# Patient Record
Sex: FEMALE | Race: WHITE | NOT HISPANIC OR LATINO | Employment: FULL TIME | URBAN - METROPOLITAN AREA
[De-identification: names, ages, dates, MRNs, and addresses within clinical notes are randomized per-mention and may not be internally consistent; named-entity substitution may affect disease eponyms.]

---

## 2017-01-04 ENCOUNTER — TELEPHONE (OUTPATIENT)
Dept: FAMILY MEDICINE | Facility: CLINIC | Age: 37
End: 2017-01-04

## 2017-01-04 NOTE — TELEPHONE ENCOUNTER
Stated, blood pressure 152/109. Requesting to see Dr Mai. Phone room instructed to schedule patient with any available provider.

## 2017-01-12 ENCOUNTER — OFFICE VISIT (OUTPATIENT)
Dept: FAMILY MEDICINE | Facility: CLINIC | Age: 37
End: 2017-01-12
Payer: COMMERCIAL

## 2017-01-12 ENCOUNTER — LAB VISIT (OUTPATIENT)
Dept: LAB | Facility: HOSPITAL | Age: 37
End: 2017-01-12
Attending: FAMILY MEDICINE
Payer: COMMERCIAL

## 2017-01-12 VITALS
OXYGEN SATURATION: 99 % | TEMPERATURE: 99 F | DIASTOLIC BLOOD PRESSURE: 84 MMHG | HEIGHT: 60 IN | BODY MASS INDEX: 38.52 KG/M2 | SYSTOLIC BLOOD PRESSURE: 144 MMHG | HEART RATE: 85 BPM | WEIGHT: 196.19 LBS

## 2017-01-12 DIAGNOSIS — Z00.00 ANNUAL PHYSICAL EXAM: ICD-10-CM

## 2017-01-12 DIAGNOSIS — Z00.00 ANNUAL PHYSICAL EXAM: Primary | ICD-10-CM

## 2017-01-12 DIAGNOSIS — I10 ESSENTIAL HYPERTENSION: ICD-10-CM

## 2017-01-12 LAB
ALBUMIN SERPL BCP-MCNC: 3.8 G/DL
ALP SERPL-CCNC: 100 U/L
ALT SERPL W/O P-5'-P-CCNC: 18 U/L
ANION GAP SERPL CALC-SCNC: 7 MMOL/L
AST SERPL-CCNC: 19 U/L
BASOPHILS # BLD AUTO: 0.01 K/UL
BASOPHILS NFR BLD: 0.1 %
BILIRUB SERPL-MCNC: 0.3 MG/DL
BUN SERPL-MCNC: 6 MG/DL
CALCIUM SERPL-MCNC: 9.4 MG/DL
CHLORIDE SERPL-SCNC: 106 MMOL/L
CHOLEST/HDLC SERPL: 3.8 {RATIO}
CO2 SERPL-SCNC: 28 MMOL/L
CREAT SERPL-MCNC: 0.8 MG/DL
DIFFERENTIAL METHOD: ABNORMAL
EOSINOPHIL # BLD AUTO: 0.1 K/UL
EOSINOPHIL NFR BLD: 0.9 %
ERYTHROCYTE [DISTWIDTH] IN BLOOD BY AUTOMATED COUNT: 16.9 %
EST. GFR  (AFRICAN AMERICAN): >60 ML/MIN/1.73 M^2
EST. GFR  (NON AFRICAN AMERICAN): >60 ML/MIN/1.73 M^2
GLUCOSE SERPL-MCNC: 72 MG/DL
HCT VFR BLD AUTO: 33.8 %
HDL/CHOLESTEROL RATIO: 26.6 %
HDLC SERPL-MCNC: 222 MG/DL
HDLC SERPL-MCNC: 59 MG/DL
HGB BLD-MCNC: 10.6 G/DL
LDLC SERPL CALC-MCNC: 150 MG/DL
LYMPHOCYTES # BLD AUTO: 2.8 K/UL
LYMPHOCYTES NFR BLD: 30.7 %
MCH RBC QN AUTO: 23.7 PG
MCHC RBC AUTO-ENTMCNC: 31.4 %
MCV RBC AUTO: 75 FL
MONOCYTES # BLD AUTO: 0.6 K/UL
MONOCYTES NFR BLD: 6.1 %
NEUTROPHILS # BLD AUTO: 5.8 K/UL
NEUTROPHILS NFR BLD: 62.2 %
NONHDLC SERPL-MCNC: 163 MG/DL
PLATELET # BLD AUTO: 494 K/UL
PMV BLD AUTO: 10.6 FL
POTASSIUM SERPL-SCNC: 4 MMOL/L
PROT SERPL-MCNC: 7.4 G/DL
RBC # BLD AUTO: 4.48 M/UL
SODIUM SERPL-SCNC: 141 MMOL/L
TRIGL SERPL-MCNC: 65 MG/DL
TSH SERPL DL<=0.005 MIU/L-ACNC: 1.69 UIU/ML
URATE SERPL-MCNC: 5.8 MG/DL
WBC # BLD AUTO: 9.23 K/UL

## 2017-01-12 PROCEDURE — 80053 COMPREHEN METABOLIC PANEL: CPT

## 2017-01-12 PROCEDURE — 84443 ASSAY THYROID STIM HORMONE: CPT

## 2017-01-12 PROCEDURE — 3079F DIAST BP 80-89 MM HG: CPT | Mod: S$GLB,,, | Performed by: FAMILY MEDICINE

## 2017-01-12 PROCEDURE — 93005 ELECTROCARDIOGRAM TRACING: CPT | Mod: S$GLB,,, | Performed by: FAMILY MEDICINE

## 2017-01-12 PROCEDURE — 3077F SYST BP >= 140 MM HG: CPT | Mod: S$GLB,,, | Performed by: FAMILY MEDICINE

## 2017-01-12 PROCEDURE — 99999 PR PBB SHADOW E&M-EST. PATIENT-LVL III: CPT | Mod: PBBFAC,,, | Performed by: FAMILY MEDICINE

## 2017-01-12 PROCEDURE — 93010 ELECTROCARDIOGRAM REPORT: CPT | Mod: S$GLB,,, | Performed by: INTERNAL MEDICINE

## 2017-01-12 PROCEDURE — 83036 HEMOGLOBIN GLYCOSYLATED A1C: CPT

## 2017-01-12 PROCEDURE — 36415 COLL VENOUS BLD VENIPUNCTURE: CPT

## 2017-01-12 PROCEDURE — 80061 LIPID PANEL: CPT

## 2017-01-12 PROCEDURE — 99395 PREV VISIT EST AGE 18-39: CPT | Mod: S$GLB,,, | Performed by: FAMILY MEDICINE

## 2017-01-12 PROCEDURE — 85025 COMPLETE CBC W/AUTO DIFF WBC: CPT

## 2017-01-12 PROCEDURE — 84550 ASSAY OF BLOOD/URIC ACID: CPT

## 2017-01-12 RX ORDER — HYDROCHLOROTHIAZIDE 12.5 MG/1
12.5 TABLET ORAL DAILY
Qty: 30 TABLET | Refills: 11 | Status: SHIPPED | OUTPATIENT
Start: 2017-01-12 | End: 2017-02-09 | Stop reason: SDUPTHER

## 2017-01-12 NOTE — PROGRESS NOTES
Chief Complaint   Patient presents with    Hypertension     SUBJECTIVE:   36 y.o. female for annual routine Pap and checkup.  Current Outpatient Prescriptions   Medication Sig Dispense Refill    VENLAFAXINE HCL (EFFEXOR ORAL) Take by mouth.      zolpidem (AMBIEN) 10 mg Tab Take 5 mg by mouth nightly as needed.      hydrochlorothiazide (HYDRODIURIL) 12.5 MG Tab Take 1 tablet (12.5 mg total) by mouth once daily. 30 tablet 11    ranitidine (ZANTAC) 300 MG tablet Take 1 tablet (300 mg total) by mouth 2 (two) times daily. 60 tablet 11     No current facility-administered medications for this visit.      Allergies: Review of patient's allergies indicates no known allergies.   Patient's last menstrual period was 12/26/2016.    ROS:  Feeling well. No dyspnea or chest pain on exertion.  No abdominal pain, change in bowel habits, black or bloody stools.  No urinary tract symptoms. GYN ROS: normal menses, no abnormal bleeding, pelvic pain or discharge, no breast pain or new or enlarging lumps on self exam. No neurological complaints.    OBJECTIVE:   The patient appears well, alert, oriented x 3, in no distress.  Visit Vitals    BP (!) 144/84 (BP Location: Right arm, Patient Position: Sitting, BP Method: Manual)    Pulse 85    Temp 99.3 °F (37.4 °C) (Oral)    Ht 5' (1.524 m)    Wt 89 kg (196 lb 3.4 oz)    LMP 12/26/2016    SpO2 99%    BMI 38.32 kg/m2     ENT normal.  Neck supple. No adenopathy or thyromegaly. SHI. Lungs are clear, good air entry, no wheezes, rhonchi or rales. S1 and S2 normal, no murmurs, regular rate and rhythm. Abdomen soft without tenderness, guarding, mass or organomegaly. Extremities show no edema, normal peripheral pulses. Neurological is normal, no focal findings.    BREAST EXAM: deferred    PELVIC EXAM: deferred    ASSESSMENT:   1. Annual physical exam    2. Essential hypertension          PLAN:   Rosie was seen today for hypertension.    Diagnoses and all orders for this  visit:    Annual physical exam  -     CBC auto differential; Future  -     Comprehensive metabolic panel; Future  -     Hemoglobin A1c; Future  -     Lipid panel; Future  -     TSH; Future  -     Uric acid; Future  -     Urinalysis; Future  Counseled on age appropriate medical preventative services, including age appropriate cancer screenings, over all nutritional health, need for a consistent exercise regimen and an over all push towards maintaining a vigorous and active lifestyle.  Counseled on age appropriate vaccines and discussed upcoming health care needs based on age/gender.  Spent time with patient counseling on need for a good patient/doctor relationship moving forward.  Discussed use of common OTC medications and supplements.  Discussed common dietary aids and use of caffeine and the need for good sleep hygiene and stress management.    Essential hypertension  -     IN OFFICE EKG 12-LEAD (to Muse)  -     hydrochlorothiazide (HYDRODIURIL) 12.5 MG Tab; Take 1 tablet (12.5 mg total) by mouth once daily.    Potential medication side effects were discussed with the patient; let me know if any occur.  EKG normal

## 2017-01-13 LAB
ESTIMATED AVG GLUCOSE: 105 MG/DL
HBA1C MFR BLD HPLC: 5.3 %

## 2017-02-09 ENCOUNTER — PATIENT MESSAGE (OUTPATIENT)
Dept: ADMINISTRATIVE | Facility: OTHER | Age: 37
End: 2017-02-09

## 2017-02-09 ENCOUNTER — LAB VISIT (OUTPATIENT)
Dept: LAB | Facility: HOSPITAL | Age: 37
End: 2017-02-09
Attending: FAMILY MEDICINE
Payer: COMMERCIAL

## 2017-02-09 ENCOUNTER — PATIENT MESSAGE (OUTPATIENT)
Dept: FAMILY MEDICINE | Facility: CLINIC | Age: 37
End: 2017-02-09

## 2017-02-09 ENCOUNTER — OFFICE VISIT (OUTPATIENT)
Dept: FAMILY MEDICINE | Facility: CLINIC | Age: 37
End: 2017-02-09
Payer: COMMERCIAL

## 2017-02-09 VITALS
OXYGEN SATURATION: 100 % | TEMPERATURE: 99 F | WEIGHT: 185.19 LBS | HEART RATE: 95 BPM | DIASTOLIC BLOOD PRESSURE: 76 MMHG | SYSTOLIC BLOOD PRESSURE: 142 MMHG | HEIGHT: 60 IN | BODY MASS INDEX: 36.36 KG/M2

## 2017-02-09 DIAGNOSIS — D50.9 MICROCYTIC ANEMIA: ICD-10-CM

## 2017-02-09 DIAGNOSIS — E66.9 OBESITY (BMI 35.0-39.9 WITHOUT COMORBIDITY): ICD-10-CM

## 2017-02-09 DIAGNOSIS — I10 ESSENTIAL HYPERTENSION: Primary | ICD-10-CM

## 2017-02-09 DIAGNOSIS — M43.16 SPONDYLOLISTHESIS OF LUMBAR REGION: ICD-10-CM

## 2017-02-09 LAB
FERRITIN SERPL-MCNC: 5 NG/ML
IRON SERPL-MCNC: 17 UG/DL
SATURATED IRON: 4 %
TOTAL IRON BINDING CAPACITY: 441 UG/DL
TRANSFERRIN SERPL-MCNC: 298 MG/DL

## 2017-02-09 PROCEDURE — 36415 COLL VENOUS BLD VENIPUNCTURE: CPT

## 2017-02-09 PROCEDURE — 83540 ASSAY OF IRON: CPT

## 2017-02-09 PROCEDURE — 3077F SYST BP >= 140 MM HG: CPT | Mod: S$GLB,,, | Performed by: FAMILY MEDICINE

## 2017-02-09 PROCEDURE — 3078F DIAST BP <80 MM HG: CPT | Mod: S$GLB,,, | Performed by: FAMILY MEDICINE

## 2017-02-09 PROCEDURE — 99214 OFFICE O/P EST MOD 30 MIN: CPT | Mod: S$GLB,,, | Performed by: FAMILY MEDICINE

## 2017-02-09 PROCEDURE — 82728 ASSAY OF FERRITIN: CPT

## 2017-02-09 PROCEDURE — 99999 PR PBB SHADOW E&M-EST. PATIENT-LVL III: CPT | Mod: PBBFAC,,, | Performed by: FAMILY MEDICINE

## 2017-02-09 RX ORDER — FERROUS SULFATE 325(65) MG
325 TABLET ORAL DAILY
Qty: 90 TABLET | Refills: 1 | Status: SHIPPED | OUTPATIENT
Start: 2017-02-09 | End: 2019-08-06

## 2017-02-09 RX ORDER — LISINOPRIL 5 MG/1
5 TABLET ORAL DAILY
Qty: 90 TABLET | Refills: 3 | Status: SHIPPED | OUTPATIENT
Start: 2017-02-09 | End: 2018-03-16 | Stop reason: SDUPTHER

## 2017-02-09 RX ORDER — TIZANIDINE 4 MG/1
4-8 TABLET ORAL EVERY 8 HOURS PRN
Qty: 60 TABLET | Refills: 0 | Status: SHIPPED | OUTPATIENT
Start: 2017-02-09 | End: 2017-03-11

## 2017-02-09 RX ORDER — HYDROCHLOROTHIAZIDE 25 MG/1
25 TABLET ORAL DAILY
Qty: 30 TABLET | Refills: 11 | Status: SHIPPED | OUTPATIENT
Start: 2017-02-09 | End: 2017-02-09 | Stop reason: SINTOL

## 2017-02-09 RX ORDER — LIDOCAINE 50 MG/G
1 PATCH TOPICAL DAILY
Qty: 30 PATCH | Refills: 0 | Status: ON HOLD | OUTPATIENT
Start: 2017-02-09 | End: 2017-10-24 | Stop reason: HOSPADM

## 2017-02-09 NOTE — ASSESSMENT & PLAN NOTE
Improving, lost 11 lbs.   The patient is asked to make an attempt to improve diet and exercise patterns to aid in medical management of this problem.

## 2017-02-09 NOTE — ASSESSMENT & PLAN NOTE
Side effects with the HCTZ, not controlled we will start low dose ACEI and get on digital HTN program

## 2017-02-09 NOTE — ASSESSMENT & PLAN NOTE
Has been tolerable but going to [place] and driving and walking will aggravate, lidoderm patches helped in the past, will do a muscle relaxer at night PRN in case    See about compounded pain cream

## 2017-02-09 NOTE — PROGRESS NOTES
Chief Complaint   Patient presents with    Hypertension     follow up        SUBJECTIVE:  Rosie Izaguirre is a 36 y.o. female here for follow up of HTN and lab work and couldn't tolerate the HCTZ secondary for increased urinary frequency.  The patient is taking hypertensive medications compliantly without side effects.  Denies chest pain, dyspnea, edema, or TIA's.  .  Currently has co-morbidities including per problem list.      Social History   Substance Use Topics    Smoking status: Never Smoker    Smokeless tobacco: Never Used    Alcohol use No       Patient Active Problem List    Diagnosis Date Noted    Essential hypertension 02/09/2017     New over last year  No secondary affects to date  ekg 1/2017-normal  HCTZ 1/2017-too much urination 2/17 discontinued  2/2017-lisinopril 5  Start digital HTN program 1/2017      Spondylolisthesis of lumbar region 02/09/2017     Since teenage years.  She has had ARIANNA in the past that helps.  She has done PT and takes OTC medication  Flares up every now and then.      Microcytic anemia 02/09/2017     Noted in 2014      MDD (major depressive disorder) 09/26/2014    Obesity (BMI 35.0-39.9 without comorbidity) 09/26/2014    Insomnia due to mental disorder 09/26/2014    GERD (gastroesophageal reflux disease) 09/26/2014       Review of Systems   Respiratory: Negative.    Cardiovascular: Negative.    Musculoskeletal: Positive for back pain and myalgias.   Psychiatric/Behavioral: Positive for depression. The patient is not nervous/anxious.        OBJECTIVE:  Visit Vitals    BP (!) 142/76 (BP Location: Right arm, Patient Position: Sitting, BP Method: Manual)    Pulse 95    Temp 99.3 °F (37.4 °C) (Oral)    Ht 5' (1.524 m)    Wt 84 kg (185 lb 3 oz)    LMP 01/22/2016    SpO2 100%    BMI 36.17 kg/m2       Wt Readings from Last 3 Encounters:   02/09/17 84 kg (185 lb 3 oz)   01/12/17 89 kg (196 lb 3.4 oz)   09/26/14 86 kg (189 lb 11.2 oz)     BP Readings from Last 3  Encounters:   02/09/17 (!) 142/76   01/12/17 (!) 144/84   09/26/14 130/88       She appears well, in no apparent distress.  Alert and oriented times three, pleasant and cooperative. Vital signs are as documented in vital signs section.  No real pallor  No tachycardia  No edema  She is obese    Review of old Records:  Reviewed last note    Review of old labs:    Lab Visit on 01/12/2017   Component Date Value Ref Range Status    Specimen UA 01/12/2017 Urine, Clean Catch   Final    Color, UA 01/12/2017 Straw  Yellow, Straw, Riddhi Final    Appearance, UA 01/12/2017 Clear  Clear Final    pH, UA 01/12/2017 6.0  5.0 - 8.0 Final    Specific Gravity, UA 01/12/2017 1.010  1.005 - 1.030 Final    Protein, UA 01/12/2017 Negative  Negative Final    Comment: Recommend a 24 hour urine protein or a urine   protein/creatinine ratio if globulin induced proteinuria is  clinically suspected.      Glucose, UA 01/12/2017 Negative  Negative Final    Ketones, UA 01/12/2017 Negative  Negative Final    Bilirubin (UA) 01/12/2017 Negative  Negative Final    Occult Blood UA 01/12/2017 Negative  Negative Final    Nitrite, UA 01/12/2017 Negative  Negative Final    Urobilinogen, UA 01/12/2017 Negative  <2.0 EU/dL Final    Leukocytes, UA 01/12/2017 Negative  Negative Final   Lab Visit on 01/12/2017   Component Date Value Ref Range Status    WBC 01/12/2017 9.23  3.90 - 12.70 K/uL Final    RBC 01/12/2017 4.48  4.00 - 5.40 M/uL Final    Hemoglobin 01/12/2017 10.6* 12.0 - 16.0 g/dL Final    Hematocrit 01/12/2017 33.8* 37.0 - 48.5 % Final    MCV 01/12/2017 75* 82 - 98 fL Final    MCH 01/12/2017 23.7* 27.0 - 31.0 pg Final    MCHC 01/12/2017 31.4* 32.0 - 36.0 % Final    RDW 01/12/2017 16.9* 11.5 - 14.5 % Final    Platelets 01/12/2017 494* 150 - 350 K/uL Final    MPV 01/12/2017 10.6  9.2 - 12.9 fL Final    Gran # 01/12/2017 5.8  1.8 - 7.7 K/uL Final    Lymph # 01/12/2017 2.8  1.0 - 4.8 K/uL Final    Mono # 01/12/2017 0.6  0.3 - 1.0  K/uL Final    Eos # 01/12/2017 0.1  0.0 - 0.5 K/uL Final    Baso # 01/12/2017 0.01  0.00 - 0.20 K/uL Final    Gran% 01/12/2017 62.2  38.0 - 73.0 % Final    Lymph% 01/12/2017 30.7  18.0 - 48.0 % Final    Mono% 01/12/2017 6.1  4.0 - 15.0 % Final    Eosinophil% 01/12/2017 0.9  0.0 - 8.0 % Final    Basophil% 01/12/2017 0.1  0.0 - 1.9 % Final    Differential Method 01/12/2017 Automated   Final    Sodium 01/12/2017 141  136 - 145 mmol/L Final    Potassium 01/12/2017 4.0  3.5 - 5.1 mmol/L Final    Chloride 01/12/2017 106  95 - 110 mmol/L Final    CO2 01/12/2017 28  23 - 29 mmol/L Final    Glucose 01/12/2017 72  70 - 110 mg/dL Final    BUN, Bld 01/12/2017 6  6 - 20 mg/dL Final    Creatinine 01/12/2017 0.8  0.5 - 1.4 mg/dL Final    Calcium 01/12/2017 9.4  8.7 - 10.5 mg/dL Final    Total Protein 01/12/2017 7.4  6.0 - 8.4 g/dL Final    Albumin 01/12/2017 3.8  3.5 - 5.2 g/dL Final    Total Bilirubin 01/12/2017 0.3  0.1 - 1.0 mg/dL Final    Comment: For infants and newborns, interpretation of results should be based  on gestational age, weight and in agreement with clinical  observations.  Premature Infant recommended reference ranges:  Up to 24 hours.............<8.0 mg/dL  Up to 48 hours............<12.0 mg/dL  3-5 days..................<15.0 mg/dL  6-29 days.................<15.0 mg/dL      Alkaline Phosphatase 01/12/2017 100  55 - 135 U/L Final    AST 01/12/2017 19  10 - 40 U/L Final    ALT 01/12/2017 18  10 - 44 U/L Final    Anion Gap 01/12/2017 7* 8 - 16 mmol/L Final    eGFR if African American 01/12/2017 >60  >60 mL/min/1.73 m^2 Final    eGFR if non African American 01/12/2017 >60  >60 mL/min/1.73 m^2 Final    Comment: Calculation used to obtain the estimated glomerular filtration  rate (eGFR) is the CKD-EPI equation. Since race is unknown   in our information system, the eGFR values for   -American and Non--American patients are given   for each creatinine result.      Hemoglobin  A1C 01/12/2017 5.3  4.5 - 6.2 % Final    Comment: According to ADA guidelines, hemoglobin A1C <7.0% represents  optimal control in non-pregnant diabetic patients.  Different  metrics may apply to specific populations.   Standards of Medical Care in Diabetes - 2016.  For the purpose of screening for the presence of diabetes:  <5.7%     Consistent with the absence of diabetes  5.7-6.4%  Consistent with increasing risk for diabetes   (prediabetes)  >or=6.5%  Consistent with diabetes  Currently no consensus exists for use of hemoglobin A1C  for diagnosis of diabetes for children.      Estimated Avg Glucose 01/12/2017 105  68 - 131 mg/dL Final    Cholesterol 01/12/2017 222* 120 - 199 mg/dL Final    Comment: The National Cholesterol Education Program (NCEP) has set the  following guidelines (reference ranges) for Cholesterol:  Optimal.....................<200 mg/dL  Borderline High.............200-239 mg/dL  High........................> or = 240 mg/dL      Triglycerides 01/12/2017 65  30 - 150 mg/dL Final    Comment: The National Cholesterol Education Program (NCEP) has set the  following guidelines (reference values) for triglycerides:  Normal......................<150 mg/dL  Borderline High.............150-199 mg/dL  High........................200-499 mg/dL      HDL 01/12/2017 59  40 - 75 mg/dL Final    Comment: The National Cholesterol Education Program (NCEP) has set the  following guidelines (reference values) for HDL Cholesterol:  Low...............<40 mg/dL  Optimal...........>60 mg/dL      LDL Cholesterol 01/12/2017 150.0  63.0 - 159.0 mg/dL Final    Comment: The National Cholesterol Education Program (NCEP) has set the  following guidelines (reference values) for LDL Cholesterol:  Optimal.......................<130 mg/dL  Borderline High...............130-159 mg/dL  High..........................160-189 mg/dL  Very High.....................>190 mg/dL      HDL/Chol Ratio 01/12/2017 26.6  20.0 - 50.0 %  Final    Total Cholesterol/HDL Ratio 01/12/2017 3.8  2.0 - 5.0 Final    Non-HDL Cholesterol 01/12/2017 163  mg/dL Final    Comment: Risk category and Non-HDL cholesterol goals:  Coronary heart disease (CHD)or equivalent (10-year risk of CHD >20%):  Non-HDL cholesterol goal     <130 mg/dL  Two or more CHD risk factors and 10-year risk of CHD <= 20%:  Non-HDL cholesterol goal     <160 mg/dL  0 to 1 CHD risk factor:  Non-HDL cholesterol goal     <190 mg/dL      TSH 01/12/2017 1.686  0.400 - 4.000 uIU/mL Final    Uric Acid 01/12/2017 5.8* 2.4 - 5.7 mg/dL Final         Review of old imaging:  n/a      ASSESSMENT:  1. Essential hypertension    2. Obesity (BMI 35.0-39.9 without comorbidity)    3. Spondylolisthesis of lumbar region    4. Microcytic anemia      Problem List Items Addressed This Visit     Spondylolisthesis of lumbar region    Overview     Since teenage years.  She has had ARIANNA in the past that helps.  She has done PT and takes OTC medication  Flares up every now and then.         Current Assessment & Plan     Has been tolerable but going to [place] and driving and walking will aggravate, lidoderm patches helped in the past, will do a muscle relaxer at night PRN in case    See about compounded pain cream         Relevant Medications    tizanidine (ZANAFLEX) 4 MG tablet    lidocaine (LIDODERM) 5 %    Obesity (BMI 35.0-39.9 without comorbidity)    Current Assessment & Plan     Improving, lost 11 lbs.   The patient is asked to make an attempt to improve diet and exercise patterns to aid in medical management of this problem.           Microcytic anemia    Overview     Noted in 2014         Current Assessment & Plan     Get lab work up for iron deficiency, treat accordingly  Likely source is menorrhea         Relevant Orders    Iron and TIBC    Ferritin    Essential hypertension - Primary    Overview     New over last year  No secondary affects to date  ekg 1/2017-normal  HCTZ 1/2017-too much urination 2/17  discontinued  2/2017-lisinopril 5  Start digital HTN program 1/2017         Current Assessment & Plan     Side effects with the HCTZ, not controlled we will start low dose ACEI and get on digital HTN program         Relevant Medications    lisinopril (PRINIVIL,ZESTRIL) 5 MG tablet    Other Relevant Orders    Hypertension Digital Medicine (HDMP)  Enrollment Order (Completed)    Assign HDMP Onboarding Questionnaire Series (Completed)              PLAN:  Essential hypertension  Side effects with the HCTZ, not controlled we will start low dose ACEI and get on digital HTN program    Spondylolisthesis of lumbar region  Has been tolerable but going to [place] and driving and walking will aggravate, lidoderm patches helped in the past, will do a muscle relaxer at night PRN in case    See about compounded pain cream    Obesity (BMI 35.0-39.9 without comorbidity)  Improving, lost 11 lbs.   The patient is asked to make an attempt to improve diet and exercise patterns to aid in medical management of this problem.      Microcytic anemia  Get lab work up for iron deficiency, treat accordingly  Likely source is menorrhea      Cbc, iron work up in 3 months to see if repleted and improving  Return in about 3 months (around 5/9/2017) for assess treatment plan.

## 2017-02-16 ENCOUNTER — PATIENT OUTREACH (OUTPATIENT)
Dept: OTHER | Facility: OTHER | Age: 37
End: 2017-02-16
Payer: COMMERCIAL

## 2017-02-16 NOTE — PROGRESS NOTES
Last 5 Patient Entered Readings                                                               Current 30 Day Average: 144/93     Recent Readings 4/2/2017 3/26/2017 3/23/2017 3/23/2017 3/23/2017    Systolic BP (mmHg) 149 140 145 151 146    Diastolic BP (mmHg) 94 95 90 94 98    Pulse 86 94 91 78 74        I have attempted to contact Ms. Rosie Izaguirre several times via phone and [facility]. Non-compliance letter sent 4/12/17. Patient non-complaint and will be removed from the HDMP.

## 2017-02-16 NOTE — LETTER
Rola Bush  [address] [place] [address]     Dear Rosie,    Thank you for enrolling in the [facility] Program. To participate in our program, we ask that you submit a blood pressure reading at least once weekly through your [facility] Account and maintain regular contact with your Care Team.  We have not received any data or heard from you in some time.     The Digital Medicine Care Team has attempted to reach you on multiple occasions to determine if you would like to continue participating in the program. While we encourage you to continue participating fully, we understand that circumstances may change.      To continue participating in the program, please contact me at [phone]. If we do not hear back, you will be un-enrolled and your physician will be notified of your decision.    We look forward to hearing from you soon.    Sincerely,     Rola Bush  [facility]                                                                                                                         Rosie Izaguirre  [address] [address] [address]

## 2017-02-16 NOTE — LETTER
Rola Bush  [address] [place] [address]     Dear Rosie,    Thank you for enrolling in the [facility] Program. To participate in our program, we ask that you submit a blood pressure reading at least once weekly through your [facility] Account and maintain regular contact with your Care Team.  We have not received any data or heard from you in some time.     The Digital Medicine Care Team has attempted to reach you on multiple occasions to determine if you would like to continue participating in the program. While we encourage you to continue participating fully, we understand that circumstances may change.      To continue participating in the program, please contact me at [phone]. If we do not hear back, you will be un-enrolled and your physician will be notified of your decision.    We look forward to hearing from you soon.    Sincerely,     Rola Bush  Your [facility]                                                                                                                         Rosie Izaguirre  [address] [address] [address]

## 2017-03-21 ENCOUNTER — PATIENT MESSAGE (OUTPATIENT)
Dept: FAMILY MEDICINE | Facility: CLINIC | Age: 37
End: 2017-03-21

## 2017-03-21 DIAGNOSIS — I10 ESSENTIAL HYPERTENSION: Primary | ICD-10-CM

## 2017-03-22 RX ORDER — CLONIDINE HYDROCHLORIDE 0.1 MG/1
0.1 TABLET ORAL ONCE
Qty: 1 TABLET | Refills: 0 | Status: ON HOLD | OUTPATIENT
Start: 2017-03-22 | End: 2017-10-24 | Stop reason: HOSPADM

## 2017-10-18 ENCOUNTER — HOSPITAL ENCOUNTER (EMERGENCY)
Facility: HOSPITAL | Age: 37
Discharge: PSYCHIATRIC OR SUBSTANCE USE FACILITY | End: 2017-10-18
Attending: EMERGENCY MEDICINE
Payer: COMMERCIAL

## 2017-10-18 ENCOUNTER — HOSPITAL ENCOUNTER (INPATIENT)
Facility: HOSPITAL | Age: 37
LOS: 6 days | Discharge: HOME | DRG: 918 | End: 2017-10-24
Attending: PSYCHIATRY & NEUROLOGY | Admitting: PSYCHIATRY & NEUROLOGY
Payer: COMMERCIAL

## 2017-10-18 ENCOUNTER — TELEPHONE (OUTPATIENT)
Dept: FAMILY MEDICINE | Facility: CLINIC | Age: 37
End: 2017-10-18

## 2017-10-18 VITALS
TEMPERATURE: 98 F | DIASTOLIC BLOOD PRESSURE: 77 MMHG | OXYGEN SATURATION: 100 % | WEIGHT: 180 LBS | HEIGHT: 60 IN | RESPIRATION RATE: 16 BRPM | HEART RATE: 95 BPM | BODY MASS INDEX: 35.34 KG/M2 | SYSTOLIC BLOOD PRESSURE: 128 MMHG

## 2017-10-18 DIAGNOSIS — T42.4X2A INTENTIONAL BENZODIAZEPINE OVERDOSE, INITIAL ENCOUNTER: ICD-10-CM

## 2017-10-18 DIAGNOSIS — R07.9 CHEST PAIN: ICD-10-CM

## 2017-10-18 DIAGNOSIS — T65.92XA: ICD-10-CM

## 2017-10-18 DIAGNOSIS — F13.20 SEDATIVE HYPNOTIC OR ANXIOLYTIC DEPENDENCE: ICD-10-CM

## 2017-10-18 DIAGNOSIS — F32.A DEPRESSION, UNSPECIFIED DEPRESSION TYPE: Primary | ICD-10-CM

## 2017-10-18 DIAGNOSIS — F43.25 ADJUSTMENT DISORDER WITH MIXED DISTURBANCE OF EMOTIONS AND CONDUCT: ICD-10-CM

## 2017-10-18 PROBLEM — F43.23 ADJUSTMENT DISORDER WITH MIXED ANXIETY AND DEPRESSED MOOD: Status: ACTIVE | Noted: 2017-10-18

## 2017-10-18 LAB
ALBUMIN SERPL BCP-MCNC: 3.7 G/DL
ALP SERPL-CCNC: 104 U/L
ALT SERPL W/O P-5'-P-CCNC: 20 U/L
AMPHET+METHAMPHET UR QL: NEGATIVE
ANION GAP SERPL CALC-SCNC: 7 MMOL/L
APAP SERPL-MCNC: <3 UG/ML
AST SERPL-CCNC: 24 U/L
B-HCG UR QL: NEGATIVE
BACTERIA #/AREA URNS HPF: NORMAL /HPF
BARBITURATES UR QL SCN>200 NG/ML: NEGATIVE
BASOPHILS # BLD AUTO: 0.02 K/UL
BASOPHILS NFR BLD: 0.2 %
BENZODIAZ UR QL SCN>200 NG/ML: NORMAL
BILIRUB SERPL-MCNC: 0.3 MG/DL
BILIRUB UR QL STRIP: NEGATIVE
BUN SERPL-MCNC: 8 MG/DL
BZE UR QL SCN: NEGATIVE
CALCIUM SERPL-MCNC: 9.6 MG/DL
CANNABINOIDS UR QL SCN: NEGATIVE
CHLORIDE SERPL-SCNC: 104 MMOL/L
CLARITY UR: ABNORMAL
CO2 SERPL-SCNC: 28 MMOL/L
COLOR UR: YELLOW
CREAT SERPL-MCNC: 0.8 MG/DL
CREAT UR-MCNC: 127 MG/DL
CTP QC/QA: YES
DIFFERENTIAL METHOD: ABNORMAL
EOSINOPHIL # BLD AUTO: 0 K/UL
EOSINOPHIL NFR BLD: 0.3 %
ERYTHROCYTE [DISTWIDTH] IN BLOOD BY AUTOMATED COUNT: 14.6 %
EST. GFR  (AFRICAN AMERICAN): >60 ML/MIN/1.73 M^2
EST. GFR  (NON AFRICAN AMERICAN): >60 ML/MIN/1.73 M^2
ETHANOL SERPL-MCNC: <10 MG/DL
GLUCOSE SERPL-MCNC: 77 MG/DL
GLUCOSE UR QL STRIP: NEGATIVE
HCT VFR BLD AUTO: 35.4 %
HGB BLD-MCNC: 11.5 G/DL
HGB UR QL STRIP: NEGATIVE
KETONES UR QL STRIP: ABNORMAL
LEUKOCYTE ESTERASE UR QL STRIP: ABNORMAL
LYMPHOCYTES # BLD AUTO: 2.8 K/UL
LYMPHOCYTES NFR BLD: 26 %
MCH RBC QN AUTO: 26.4 PG
MCHC RBC AUTO-ENTMCNC: 32.5 G/DL
MCV RBC AUTO: 81 FL
METHADONE UR QL SCN>300 NG/ML: NEGATIVE
MICROSCOPIC COMMENT: NORMAL
MONOCYTES # BLD AUTO: 0.7 K/UL
MONOCYTES NFR BLD: 6.3 %
NEUTROPHILS # BLD AUTO: 7.2 K/UL
NEUTROPHILS NFR BLD: 67.2 %
NITRITE UR QL STRIP: NEGATIVE
OPIATES UR QL SCN: NEGATIVE
PCP UR QL SCN>25 NG/ML: NEGATIVE
PH UR STRIP: 6 [PH] (ref 5–8)
PLATELET # BLD AUTO: 500 K/UL
PMV BLD AUTO: 9.9 FL
POTASSIUM SERPL-SCNC: 3.5 MMOL/L
PROT SERPL-MCNC: 7.8 G/DL
PROT UR QL STRIP: NEGATIVE
RBC # BLD AUTO: 4.36 M/UL
SODIUM SERPL-SCNC: 139 MMOL/L
SP GR UR STRIP: 1.01 (ref 1–1.03)
SQUAMOUS #/AREA URNS HPF: 4 /HPF
TOXICOLOGY INFORMATION: NORMAL
TSH SERPL DL<=0.005 MIU/L-ACNC: 2.95 UIU/ML
URN SPEC COLLECT METH UR: ABNORMAL
UROBILINOGEN UR STRIP-ACNC: NEGATIVE EU/DL
WBC # BLD AUTO: 10.64 K/UL
WBC #/AREA URNS HPF: 2 /HPF (ref 0–5)

## 2017-10-18 PROCEDURE — 80307 DRUG TEST PRSMV CHEM ANLYZR: CPT

## 2017-10-18 PROCEDURE — 80320 DRUG SCREEN QUANTALCOHOLS: CPT

## 2017-10-18 PROCEDURE — 63600175 PHARM REV CODE 636 W HCPCS: Performed by: EMERGENCY MEDICINE

## 2017-10-18 PROCEDURE — 80329 ANALGESICS NON-OPIOID 1 OR 2: CPT

## 2017-10-18 PROCEDURE — 84443 ASSAY THYROID STIM HORMONE: CPT

## 2017-10-18 PROCEDURE — 85025 COMPLETE CBC W/AUTO DIFF WBC: CPT

## 2017-10-18 PROCEDURE — 81000 URINALYSIS NONAUTO W/SCOPE: CPT

## 2017-10-18 PROCEDURE — 80053 COMPREHEN METABOLIC PANEL: CPT

## 2017-10-18 PROCEDURE — 99285 EMERGENCY DEPT VISIT HI MDM: CPT | Mod: 25

## 2017-10-18 PROCEDURE — 25000003 PHARM REV CODE 250: Performed by: STUDENT IN AN ORGANIZED HEALTH CARE EDUCATION/TRAINING PROGRAM

## 2017-10-18 PROCEDURE — 12400001 HC PSYCH SEMI-PRIVATE ROOM

## 2017-10-18 PROCEDURE — 25000003 PHARM REV CODE 250: Performed by: EMERGENCY MEDICINE

## 2017-10-18 PROCEDURE — 81025 URINE PREGNANCY TEST: CPT | Performed by: EMERGENCY MEDICINE

## 2017-10-18 PROCEDURE — 90792 PSYCH DIAG EVAL W/MED SRVCS: CPT | Mod: ,,, | Performed by: PSYCHIATRY & NEUROLOGY

## 2017-10-18 PROCEDURE — 96372 THER/PROPH/DIAG INJ SC/IM: CPT

## 2017-10-18 PROCEDURE — 29130 APPL FINGER SPLINT STATIC: CPT | Mod: F9

## 2017-10-18 RX ORDER — ACETAMINOPHEN 325 MG/1
650 TABLET ORAL EVERY 6 HOURS PRN
Status: DISCONTINUED | OUTPATIENT
Start: 2017-10-18 | End: 2017-10-24 | Stop reason: HOSPADM

## 2017-10-18 RX ORDER — HYDROXYZINE PAMOATE 50 MG/1
50 CAPSULE ORAL NIGHTLY PRN
Status: DISCONTINUED | OUTPATIENT
Start: 2017-10-18 | End: 2017-10-19

## 2017-10-18 RX ORDER — ALPRAZOLAM 0.5 MG/1
0.5 TABLET ORAL DAILY PRN
Status: ON HOLD | COMMUNITY
End: 2017-10-24 | Stop reason: HOSPADM

## 2017-10-18 RX ORDER — KETOROLAC TROMETHAMINE 30 MG/ML
30 INJECTION, SOLUTION INTRAMUSCULAR; INTRAVENOUS
Status: COMPLETED | OUTPATIENT
Start: 2017-10-18 | End: 2017-10-18

## 2017-10-18 RX ORDER — ACETAMINOPHEN 500 MG
1000 TABLET ORAL
Status: COMPLETED | OUTPATIENT
Start: 2017-10-18 | End: 2017-10-18

## 2017-10-18 RX ADMIN — ACETAMINOPHEN 1000 MG: 500 TABLET ORAL at 05:10

## 2017-10-18 RX ADMIN — HYDROXYZINE PAMOATE 50 MG: 50 CAPSULE ORAL at 09:10

## 2017-10-18 RX ADMIN — KETOROLAC TROMETHAMINE 30 MG: 30 INJECTION, SOLUTION INTRAMUSCULAR at 05:10

## 2017-10-18 NOTE — ED NOTES
CALLED [facility] TRANSPORTATION [facility]ICE.SPOKE WITH JORGE.WILL CALL BACK WITH AN ETA.

## 2017-10-18 NOTE — ED PROVIDER NOTES
"Encounter Date: 10/18/2017    SCRIBE #1 NOTE: I, Ginnyalekseygilberto Cuauhtemoc, am scribing for, and in the presence of,  Owen Welsh III, MD. I have scribed the following portions of the note - Other sections scribed: HPI and ROS.       History     Chief Complaint   Patient presents with    Drug Overdose     "she took unknown amount of ambien and xanax. I'm not sure how long she has been doing this." pt states "I'm not eating and not sleeping, super depressed."     Back Pain     lower back x 4-5 days. Multiple falls.      Chief Complaint: Drug Overdose    HPI: This 36 y.o. Female with depression and HTN presents to the ED secondary to a drug overdose. Patient has been taking multiple Xanax tablets at one time every 6 hours for the past few days. Patient states she took medication "just so I can sleep" until "Friday" when her  leaves. Patient is currently going through a divorce and reports feeling increasingly depressed despite dealing with depression x 15 years. She also reports severe right lower back pain with associated nausea. Lastly, she reports enduring 2 falls, 2 days ago. She c/o right 5th finger pain and bruising to the left thigh and buttock.       The history is provided by the patient. No  was used.     Review of patient's allergies indicates:  No Known Allergies  Past Medical History:   Diagnosis Date    Depression     Hypertension      Past Surgical History:   Procedure Laterality Date    APPENDECTOMY       Family History   Problem Relation Age of Onset    Hypertension Father      Social History   Substance Use Topics    Smoking status: Never Smoker    Smokeless tobacco: Never Used    Alcohol use No     Review of Systems   Constitutional: Negative for chills and fever.   HENT: Negative for ear pain and sore throat.    Eyes: Negative for pain.   Respiratory: Negative for cough and shortness of breath.    Cardiovascular: Negative for chest pain.   Gastrointestinal: Positive for " nausea. Negative for abdominal pain, diarrhea and vomiting.   Musculoskeletal: Positive for back pain and myalgias (arm or leg pain).   Skin: Positive for color change. Negative for rash.   Neurological: Negative for headaches.   Psychiatric/Behavioral: Positive for dysphoric mood.       Physical Exam     Initial Vitals [10/18/17 1213]   BP Pulse Resp Temp SpO2   132/86 107 16 98.5 °F (36.9 °C) 100 %      MAP       101.33         Physical Exam    Nursing note and vitals reviewed.  Constitutional: She appears well-developed and well-nourished. She is not diaphoretic. No distress.   HENT:   Head: Normocephalic and atraumatic.   Nose: Nose normal.   Mouth/Throat: Oropharynx is clear and moist. No oropharyngeal exudate.   Eyes: Conjunctivae and EOM are normal. Pupils are equal, round, and reactive to light. No scleral icterus.   Neck: Normal range of motion. Neck supple. No thyromegaly present. No tracheal deviation present.   Cardiovascular: Normal rate, regular rhythm and normal heart sounds. Exam reveals no gallop and no friction rub.    No murmur heard.  Pulmonary/Chest: Breath sounds normal. No respiratory distress. She has no wheezes. She has no rhonchi. She has no rales.   Abdominal: Soft. Bowel sounds are normal. She exhibits no distension and no mass. There is no tenderness. There is no rebound and no guarding.   Musculoskeletal: Normal range of motion. She exhibits no edema or tenderness.   Lymphadenopathy:     She has no cervical adenopathy.   Neurological: She is alert and oriented to person, place, and time. She has normal strength. No cranial nerve deficit or sensory deficit.   Skin: Skin is warm and dry. No rash noted. No erythema. No pallor.   Psychiatric: Her behavior is normal. Thought content normal.   Flat affect, depressed mood, psychomotor slowing         ED Course   Splint Application  Date/Time: 10/18/2017 3:56 PM  Performed by: VALENTINO CHO III  Authorized by: VALENTINO CHO III   Location  details: right small finger  Splint type: static finger  Supplies used: aluminum splint  Post-procedure: The splinted body part was neurovascularly unchanged following the procedure.  Patient tolerance: Patient tolerated the procedure well with no immediate complications        Labs Reviewed   CBC W/ AUTO DIFFERENTIAL - Abnormal; Notable for the following:        Result Value    Hemoglobin 11.5 (*)     Hematocrit 35.4 (*)     MCV 81 (*)     MCH 26.4 (*)     RDW 14.6 (*)     Platelets 500 (*)     All other components within normal limits   COMPREHENSIVE METABOLIC PANEL - Abnormal; Notable for the following:     Anion Gap 7 (*)     All other components within normal limits   URINALYSIS - Abnormal; Notable for the following:     Appearance, UA Hazy (*)     Ketones, UA 1+ (*)     Leukocytes, UA 2+ (*)     All other components within normal limits   ACETAMINOPHEN LEVEL - Abnormal; Notable for the following:     Acetaminophen (Tylenol), Serum <3.0 (*)     All other components within normal limits   TSH   DRUG SCREEN PANEL, URINE EMERGENCY   ALCOHOL,MEDICAL (ETHANOL)   URINALYSIS MICROSCOPIC   POCT URINE PREGNANCY             Medical Decision Making:   Initial Assessment:   36-year-old female with a history of depression presents for evaluation after taking multiple Xanax over the last few days, stating she just wanted to go to sleep until her , whom she is , is out of the house.  Her only physical complaint is low back pain, which is chronic and recurrent.  Physical examination unremarkable except for depressed mood, flat affect, psychomotor slowing.    Secondary complaint of right fifth finger pain after a mechanical fall.  On exam patient does have swelling and erythema to the region of the distal phalanx of the right fifth finger.  Differential Diagnosis:   Will obtain medical clearance evaluation and place patient on a physicians emergency certificate  Independently Interpreted Test(s):   I have  ordered and independently interpreted X-rays - see summary below.       <> Summary of X-Ray Reading(s): X-ray finger: Minimally displaced dorsal plate avulsion of the distal phalanx of the fifth finger  ED Management:  Finger splint placed.  Remainder of workup unremarkable.  Will clear for psychiatric evaluation and treatment.            Scribe Attestation:   Scribe #1: I performed the above scribed service and the documentation accurately describes the services I performed. I attest to the accuracy of the note.    Attending Attestation:           Physician Attestation for Scribe:  Physician Attestation Statement for Scribe #1: I, Owen Welsh III, MD, reviewed documentation, as scribed by Bull Posadas in my presence, and it is both accurate and complete.                 ED Course      Clinical Impression:   The primary encounter diagnosis was Depression, unspecified depression type. A diagnosis of Intentional benzodiazepine overdose, initial encounter was also pertinent to this visit.                           Owen Welsh III, MD  10/18/17 1556       Owen Welsh III, MD  10/18/17 1556

## 2017-10-18 NOTE — TELEPHONE ENCOUNTER
----- Message from Dot Misael sent at 10/18/2017 10:18 AM CDT -----  Contact: mother- Olivia  Pt's mother states she would like to speak to Dr. Mai. She states pt is not feeling well. I offered to make an appointment for her, she declined and states she needs to speak to Dr. Mai.    [phone]          [phone]

## 2017-10-18 NOTE — CONSULTS
"[facility] [place]  [place]iatry  Consult Note    Patient Name: Rosie Izaguirre  MRN: [id number]   Code Status: No Order  Admission Date: 10/18/2017  Hospital Length of Stay: 0 days  Attending Physician: Valentino Welsh III, MD  Primary Care Provider: Hang Mai MD    Current Legal Status: [facility]    Patient information was obtained from patient, relative(s) and ER records.   Inpatient consult to Psychiatry  Consult performed by: IGLESIA BRAUN  Consult ordered by: VALENTINO WELSH III        Subjective:     Principal Problem:<principal problem not specified>    Chief Complaint:  overdose     HPI: Patient Rosie Izaguirre was brought to the hospital by her sisters who were concerned for her behaviors for the past couple of days.  Patient states that she got into a fight with her  on Sunday which ended in a decision to split up.  He is leaving this Friday to go [place].  She decided to "sleep" until he left.  She took 10-15 Xanax 0.5mg tablets on Monday, yesterday, and again today.  She has been in bed for 3 days and has not gone to work.  Patient is interviewed in her room and is noted to be slurred and somewhat slowed in her responses.  She admits to about a 15 year history of depression treated by an outpatient psychiatrist (Oscar Mckeon MD) for the duration.  She admits to being "fine" until this weekend with an occasional dip in her mood "about every couple of months".  She has been sad, depressed, and lacking motivation since Sunday.  The fight with her  was over the fact that he was purchasing marijuana and she in not in agreement with drug use.  She denies any anxiety, manic, or psychotic history.  Biggest complaint is sleep.  Has been on zolpidem for years.  Also takes Effexor XR 225mg daily for about 9 years.  In the past has failed Paxil and Lexapro.  He has also prescribed her Xanax but no prescriptions written in past year or so.  He also treats her daughter (Aspergers " "and anxiety) and does write for her Xanax also.  This information was obtained in a conversation with Dr. Mckeon on the phone and he asks that primary treatment team contact him prior to discharge to coordinate outpatient follow up.  Patient and her psychiatrist deny any previous suicide attempts or self harm.  Says that the only gun in her home is an "airsoft" gun.  Note that her blood pressure is elevated.    Hospital Course: No notes on file         Patient History           Medical as of 10/18/2017     Past Medical History     Diagnosis Date Comments Source    Hx of psychiatric care -- -- Provider    Hypertension -- -- Provider    Psychiatric problem -- -- Provider    Therapy -- -- Provider          Pertinent Negatives     Diagnosis Date Noted Comments Source    History of psychiatric hospitalization 10/18/2017 -- Provider    Suicide attempt 10/18/2017 -- Provider                  Surgical as of 10/18/2017     Past Surgical History     Procedure Laterality Date Comments Source    APPENDECTOMY -- -- -- Provider                  Family as of 10/18/2017     Problem Relation Name Age of Onset Comments Source    Hypertension Father -- -- -- Provider    Anxiety disorder Daughter -- -- -- Provider    Asperger's syndrome Daughter -- -- -- Provider            Tobacco Use as of 10/18/2017     Smoking Status Smoking Start Date Smoking Quit Date Packs/day Years Used    Never Smoker -- -- -- --    Types Comments Smokeless Tobacco Status Smokeless Tobacco Quit Date Source    -- -- Never Used -- Provider            Alcohol Use as of 10/18/2017     Alcohol Use Drinks/Week Alcohol/Week Comments Source    No -- -- -- Provider            Drug Use as of 10/18/2017     Drug Use Types Frequency Comments Source    No -- -- -- Provider            Sexual Activity as of 10/18/2017     Sexually Active Birth Control Partners Comments Source    Not Asked -- -- -- Provider            Activities of Daily Living as of 10/18/2017     Activities " "of Daily Living Question Response Comments Source    Patient feels they ought to cut down on drinking/drug use Not Asked -- Provider    Patient annoyed by others criticizing their drinking/drug use Not Asked -- Provider    Patient has felt bad or guilty about drinking/drug use Not Asked -- Provider    Patient has had a drink/used drugs as an eye opener in the AM Not Asked -- Provider            Social Documentation as of 10/18/2017    **None**           Occupational as of 10/18/2017    **None**           Socioeconomic as of 10/18/2017     Marital Status Spouse Name Number of Children Years Education Preferred Language Ethnicity Race Source     -- 2 -- English /White White Provider         Pertinent History Q A Comments    as of 10/18/2017 Lives with family     Place in Birth Order      Lives in home     Number of Siblings      Raised by      Legal Involvement      Childhood Trauma      Criminal History of      Financial Status employed cares for preschoolers    Highest Level of Education      Does patient have access to a firearm? No "airsoft" gun     Service      Primary Leisure Activity      Spirituality       Past Medical History:   Diagnosis Date    Hx of psychiatric care     Hypertension     Psychiatric problem     Therapy      Past Surgical History:   Procedure Laterality Date    APPENDECTOMY       Family History     Problem Relation (Age of Onset)    Anxiety disorder Daughter    Asperger's syndrome Daughter    Hypertension Father        Social History Main Topics    Smoking status: Never Smoker    Smokeless tobacco: Never Used    Alcohol use No    Drug use: No    Sexual activity: Not on file     Review of patient's allergies indicates:  No Known Allergies    No current facility-administered medications on file prior to encounter.      Current Outpatient Prescriptions on File Prior to Encounter   Medication Sig    lisinopril (PRINIVIL,ZESTRIL) 5 MG tablet Take 1 tablet (5 mg " total) by mouth once daily.    VENLAFAXINE HCL (EFFEXOR ORAL) Take by mouth.    zolpidem (AMBIEN) 10 mg Tab Take 5 mg by mouth nightly as needed.    cloNIDine (CATAPRES) 0.1 MG tablet Take 1 tablet (0.1 mg total) by mouth once. Prior to dentist    ferrous sulfate 325 mg (65 mg iron) Tab tablet Take 1 tablet (325 mg total) by mouth once daily.    lidocaine (LIDODERM) 5 % Place 1 patch onto the skin once daily. Remove & Discard patch within 12 hours or as directed by MD    ranitidine (ZANTAC) 300 MG tablet Take 1 tablet (300 mg total) by mouth 2 (two) times daily.     Psychotherapeutics     None        Review of Systems   Musculoskeletal: Positive for back pain.     Strengths and Liabilities: Liability: Patient lacks coping skills.    Objective:     Vital Signs (Most Recent):  Temp: 98.5 °F (36.9 °C) (10/18/17 1213)  Pulse: 107 (10/18/17 1213)  Resp: 16 (10/18/17 1213)  BP: 132/86 (10/18/17 1213)  SpO2: 100 % (10/18/17 1213) Vital Signs (24h Range):  Temp:  [98.5 °F (36.9 °C)] 98.5 °F (36.9 °C)  Pulse:  [107] 107  Resp:  [16] 16  SpO2:  [100 %] 100 %  BP: (132)/(86) 132/86     Height: 5' (152.4 cm)  Weight: 81.6 kg (180 lb)  Body mass index is 35.15 kg/m².    No intake or output data in the 24 hours ending 10/18/17 1428    Physical Exam   Psychiatric:   EXAMINATION    CONSTITUTIONAL  General Appearance: personal attire    MUSCULOSKELETAL  Muscle Strength and Tone: normal  Abnormal Involuntary Movements: none noted  Gait and Station: not observe    PSYCHIATRIC MENTAL STATUS EXAM   Level of Consciousness: awake and alert but sluggish  Orientation: name, place, situation  Grooming: fair  Psychomotor Behavior: slowed  Speech: slurred tone, soft volume, slowed rate  Language: no abnormalities  Mood: depressed  Affect: flat, blunted/medicated  Thought Process: linear  Associations: intact  Thought Content: denies suicidal/homicidal/psychosis  Memory: intact to recent and remote  Attention: blunted due to  medications  Fund of Knowledge: intact to conversation  Insight: fair into mental illness of depression/anxiety  Judgment: poor towards impulsive medication use          Significant Labs:   Last 24 Hours:   Recent Lab Results       10/18/17  1350 10/18/17  1347 10/18/17  1346 10/18/17  1340      Benzodiazepines  Presumptive Positive       Methadone metabolites  Negative       Phencyclidine  Negative       Acetaminophen (Tylenol), Serum <3.0  Comment:  Toxic Levels:  Adults (4 hr post-ingestion).........>150 ug/mL  Adults (12 hr post-ingestion)........>40 ug/mL  Peds (2 hr post-ingestion, liquid)...>225 ug/mL  (L)        Albumin 3.7        Alcohol, Medical, Serum <10        Alkaline Phosphatase 104        ALT 20        Amphetamine Screen, Ur  Negative       Anion Gap 7(L)        Appearance, UA    Hazy(A)     AST 24        Bacteria, UA    Rare     Barbiturate Screen, Ur  Negative       Baso # 0.02        Basophil% 0.2        Bilirubin (UA)    Negative     Total Bilirubin 0.3  Comment:  For infants and newborns, interpretation of results should be based  on gestational age, weight and in agreement with clinical  observations.  Premature Infant recommended reference ranges:  Up to 24 hours.............<8.0 mg/dL  Up to 48 hours............<12.0 mg/dL  3-5 days..................<15.0 mg/dL  6-29 days.................<15.0 mg/dL          BUN, Bld 8        Calcium 9.6        Chloride 104        CO2 28        Cocaine (Metab.)  Negative       Color, UA    Yellow     Creatinine 0.8        Creatinine, Random Ur  127.0  Comment:  The random urine reference ranges provided were established   for 24 hour urine collections.  No reference ranges exist for  random urine specimens.  Correlate clinically.         Differential Method Automated        eGFR if  >60        eGFR if non  >60  Comment:  Calculation used to obtain the estimated glomerular filtration  rate (eGFR) is the CKD-EPI equation. Since  race is unknown   in our information system, the eGFR values for   -American and Non--American patients are given   for each creatinine result.          Eos # 0.0        Eosinophil% 0.3        Glucose 77        Glucose, UA    Negative     Gran # 7.2        Gran% 67.2        Hematocrit 35.4(L)        Hemoglobin 11.5(L)        Ketones, UA    1+(A)     Leukocytes, UA    2+(A)     Lymph # 2.8        Lymph% 26.0        MCH 26.4(L)        MCHC 32.5        MCV 81(L)        Microscopic Comment    SEE COMMENT  Comment:  Other formed elements not mentioned in the report are not   present in the microscopic examination.        Mono # 0.7        Mono% 6.3        MPV 9.9        Nitrite, UA    Negative     Occult Blood UA    Negative     Opiate Scrn, Ur  Negative       pH, UA    6.0     Platelets 500(H)        Potassium 3.5        Preg Test, Ur   Negative      Total Protein 7.8        Protein, UA    Negative  Comment:  Recommend a 24 hour urine protein or a urine   protein/creatinine ratio if globulin induced proteinuria is  clinically suspected.        Acceptable   Yes      RBC 4.36        RDW 14.6(H)        Sodium 139        Specific [place], UA    1.010     Specimen UA    Urine, Clean Catch     Squam Epithel, UA    4     Marijuana (THC) Metabolite  Negative       Toxicology Information  SEE COMMENT  Comment:  This screen includes the following classes of drugs at the   listed cut-off:  Benzodiazepines                  200 ng/ml  Methadone                        300 ng/ml  Cocaine metabolite               300 ng/ml  Opiates                          300 ng/ml  Barbiturates                     200 ng/ml  Amphetamines                    1000 ng/ml  Marijuana metabs (THC)            50 ng/ml  Phencyclidine (PCP)               25 ng/ml  High concentrations of Diphenhydramine may cross-react with  Phencyclidine PCP screening immunoassay giving a false   positive result.  High concentrations of  Methylenedioxymethamphetamine (MDMA aka  Ectasy) and other structurally similar compounds may cross-   react with the Amphetamine/Methamphetamine screening   immunoassay giving a false positive result.  A metabolite of the anti-HIV drug Sustiva () may cause  false positive results in the Marijuana metabolite (THC)   screening assay.  Note: This exception list includes only more common   interferants in toxicology screen testing.  Because of many   cross-reactantspositive results on toxicology drug screens   should be confirmed whenever results do not correlate with   clinical presentation.  This report is intended for use in clinical monitoring and  management of patients. It is not intended for use in   employment related drug testing.  Because of any cross-reactants, positive results on toxicology  drug screens should be confirmed whenever results do not  correlate with clinical presentation.  Presumptive positive results are unconfirmed and may be used   only for medical purposes.         Urobilinogen, UA    Negative     WBC, UA    2     WBC 10.64            All pertinent labs within the past 24 hours have been reviewed.    Significant Imaging: I have reviewed all pertinent imaging results/findings within the past 24 hours.    Assessment/Plan:     Adjustment disorder with mixed anxiety and depressed mood    Patient with an adjustment reaction to psychosocial factors with .  Reacted impulsively and has been overmedicating for the past couple of days.  Agree with [facility] and 1:1 sitter.  Notify  of commitment process.  Seek acute inpatient psychiatric facility when medically cleared.  Was able to speak with [facility] and they may have a female bed available.  Needs to be educated on proper coping skills and not to take Xanax or overuse medication in time of stress.        Recurrent major depressive disorder, in partial remission    Long history of depression.  Not an acute exacerbation of her  depression.  Likely that Effexor XR may be contributing to her HTN.  Consider cross tapering to Cymbalta which may help with depression/anxiety/chronic pains.             Mikael Lorenz MD   Psychiatry  [facility]

## 2017-10-18 NOTE — ASSESSMENT & PLAN NOTE
Long history of depression.  Not an acute exacerbation of her depression.  Likely that Effexor XR may be contributing to her HTN.  Consider cross tapering to Cymbalta which may help with depression/anxiety/chronic pains.

## 2017-10-18 NOTE — ED NOTES
[facility] CALLED WITH AN ETA OF ABOUT AN HOUR AND A HALF.SPOKE WITH [place].

## 2017-10-18 NOTE — SUBJECTIVE & OBJECTIVE
Patient History           Medical as of 10/18/2017     Past Medical History     Diagnosis Date Comments Source    Hx of psychiatric care -- -- Provider    Hypertension -- -- Provider    Psychiatric problem -- -- Provider    Therapy -- -- Provider          Pertinent Negatives     Diagnosis Date Noted Comments Source    History of psychiatric hospitalization 10/18/2017 -- Provider    Suicide attempt 10/18/2017 -- Provider                  Surgical as of 10/18/2017     Past Surgical History     Procedure Laterality Date Comments Source    APPENDECTOMY -- -- -- Provider                  Family as of 10/18/2017     Problem Relation Name Age of Onset Comments Source    Hypertension Father -- -- -- Provider    Anxiety disorder Daughter -- -- -- Provider    Asperger's syndrome Daughter -- -- -- Provider            Tobacco Use as of 10/18/2017     Smoking Status Smoking Start Date Smoking Quit Date Packs/day Years Used    Never Smoker -- -- -- --    Types Comments Smokeless Tobacco Status Smokeless Tobacco Quit Date Source    -- -- Never Used -- Provider            Alcohol Use as of 10/18/2017     Alcohol Use Drinks/Week Alcohol/Week Comments Source    No -- -- -- Provider            Drug Use as of 10/18/2017     Drug Use Types Frequency Comments Source    No -- -- -- Provider            Sexual Activity as of 10/18/2017     Sexually Active Birth Control Partners Comments Source    Not Asked -- -- -- Provider            Activities of Daily Living as of 10/18/2017     Activities of Daily Living Question Response Comments Source    Patient feels they ought to cut down on drinking/drug use Not Asked -- Provider    Patient annoyed by others criticizing their drinking/drug use Not Asked -- Provider    Patient has felt bad or guilty about drinking/drug use Not Asked -- Provider    Patient has had a drink/used drugs as an eye opener in the AM Not Asked -- Provider            Social Documentation as of 10/18/2017    **None**    "        Occupational as of 10/18/2017    **None**           Socioeconomic as of 10/18/2017     Marital Status Spouse Name Number of Children Years Education Preferred Language Ethnicity Race Source     -- 2 -- English /White White Provider         Pertinent History Q A Comments    as of 10/18/2017 Lives with family     Place in Birth Order      Lives in home     Number of Siblings      Raised by      Legal Involvement      Childhood Trauma      Criminal History of      Financial Status employed cares for preschoolers    Highest Level of Education      Does patient have access to a firearm? No "airsoft" gun     Service      Primary Leisure Activity      Spirituality       Past Medical History:   Diagnosis Date    Hx of psychiatric care     Hypertension     Psychiatric problem     Therapy      Past Surgical History:   Procedure Laterality Date    APPENDECTOMY       Family History     Problem Relation (Age of Onset)    Anxiety disorder Daughter    Asperger's syndrome Daughter    Hypertension Father        Social History Main Topics    Smoking status: Never Smoker    Smokeless tobacco: Never Used    Alcohol use No    Drug use: No    Sexual activity: Not on file     Review of patient's allergies indicates:  No Known Allergies    No current facility-administered medications on file prior to encounter.      Current Outpatient Prescriptions on File Prior to Encounter   Medication Sig    lisinopril (PRINIVIL,ZESTRIL) 5 MG tablet Take 1 tablet (5 mg total) by mouth once daily.    VENLAFAXINE HCL (EFFEXOR ORAL) Take by mouth.    zolpidem (AMBIEN) 10 mg Tab Take 5 mg by mouth nightly as needed.    cloNIDine (CATAPRES) 0.1 MG tablet Take 1 tablet (0.1 mg total) by mouth once. Prior to dentist    ferrous sulfate 325 mg (65 mg iron) Tab tablet Take 1 tablet (325 mg total) by mouth once daily.    lidocaine (LIDODERM) 5 % Place 1 patch onto the skin once daily. Remove & Discard patch within 12 " hours or as directed by MD    ranitidine (ZANTAC) 300 MG tablet Take 1 tablet (300 mg total) by mouth 2 (two) times daily.     Psychotherapeutics     None        Review of Systems   Musculoskeletal: Positive for back pain.     Strengths and Liabilities: Liability: Patient lacks coping skills.    Objective:     Vital Signs (Most Recent):  Temp: 98.5 °F (36.9 °C) (10/18/17 1213)  Pulse: 107 (10/18/17 1213)  Resp: 16 (10/18/17 1213)  BP: 132/86 (10/18/17 1213)  SpO2: 100 % (10/18/17 1213) Vital Signs (24h Range):  Temp:  [98.5 °F (36.9 °C)] 98.5 °F (36.9 °C)  Pulse:  [107] 107  Resp:  [16] 16  SpO2:  [100 %] 100 %  BP: (132)/(86) 132/86     Height: 5' (152.4 cm)  Weight: 81.6 kg (180 lb)  Body mass index is 35.15 kg/m².    No intake or output data in the 24 hours ending 10/18/17 1428    Physical Exam   Psychiatric:   EXAMINATION    CONSTITUTIONAL  General Appearance: personal attire    MUSCULOSKELETAL  Muscle Strength and Tone: normal  Abnormal Involuntary Movements: none noted  Gait and Station: not observe    PSYCHIATRIC MENTAL STATUS EXAM   Level of Consciousness: awake and alert but sluggish  Orientation: name, place, situation  Grooming: fair  Psychomotor Behavior: slowed  Speech: slurred tone, soft volume, slowed rate  Language: no abnormalities  Mood: depressed  Affect: flat, blunted/medicated  Thought Process: linear  Associations: intact  Thought Content: denies suicidal/homicidal/psychosis  Memory: intact to recent and remote  Attention: blunted due to medications  Fund of Knowledge: intact to conversation  Insight: fair into mental illness of depression/anxiety  Judgment: poor towards impulsive medication use          Significant Labs:   Last 24 Hours:   Recent Lab Results       10/18/17  1350 10/18/17  1347 10/18/17  1346 10/18/17  1340      Benzodiazepines  Presumptive Positive       Methadone metabolites  Negative       Phencyclidine  Negative       Acetaminophen (Tylenol), Serum <3.0  Comment:  Toxic  Levels:  Adults (4 hr post-ingestion).........>150 ug/mL  Adults (12 hr post-ingestion)........>40 ug/mL  Peds (2 hr post-ingestion, liquid)...>225 ug/mL  (L)        Albumin 3.7        Alcohol, Medical, Serum <10        Alkaline Phosphatase 104        ALT 20        Amphetamine Screen, Ur  Negative       Anion Gap 7(L)        Appearance, UA    Hazy(A)     AST 24        Bacteria, UA    Rare     Barbiturate Screen, Ur  Negative       Baso # 0.02        Basophil% 0.2        Bilirubin (UA)    Negative     Total Bilirubin 0.3  Comment:  For infants and newborns, interpretation of results should be based  on gestational age, weight and in agreement with clinical  observations.  Premature Infant recommended reference ranges:  Up to 24 hours.............<8.0 mg/dL  Up to 48 hours............<12.0 mg/dL  3-5 days..................<15.0 mg/dL  6-29 days.................<15.0 mg/dL          BUN, Bld 8        Calcium 9.6        Chloride 104        CO2 28        Cocaine (Metab.)  Negative       Color, UA    Yellow     Creatinine 0.8        Creatinine, Random Ur  127.0  Comment:  The random urine reference ranges provided were established   for 24 hour urine collections.  No reference ranges exist for  random urine specimens.  Correlate clinically.         Differential Method Automated        eGFR if  >60        eGFR if non  >60  Comment:  Calculation used to obtain the estimated glomerular filtration  rate (eGFR) is the CKD-EPI equation. Since race is unknown   in our information system, the eGFR values for   -American and Non--American patients are given   for each creatinine result.          Eos # 0.0        Eosinophil% 0.3        Glucose 77        Glucose, UA    Negative     Gran # 7.2        Gran% 67.2        Hematocrit 35.4(L)        Hemoglobin 11.5(L)        Ketones, UA    1+(A)     Leukocytes, UA    2+(A)     Lymph # 2.8        Lymph% 26.0        MCH 26.4(L)        MCHC 32.5         MCV 81(L)        Microscopic Comment    SEE COMMENT  Comment:  Other formed elements not mentioned in the report are not   present in the microscopic examination.        Mono # 0.7        Mono% 6.3        MPV 9.9        Nitrite, UA    Negative     Occult Blood UA    Negative     Opiate Scrn, Ur  Negative       pH, UA    6.0     Platelets 500(H)        Potassium 3.5        Preg Test, Ur   Negative      Total Protein 7.8        Protein, UA    Negative  Comment:  Recommend a 24 hour urine protein or a urine   protein/creatinine ratio if globulin induced proteinuria is  clinically suspected.        Acceptable   Yes      RBC 4.36        RDW 14.6(H)        Sodium 139        Specific Butler, UA    1.010     Specimen UA    Urine, Clean Catch     Squam Epithel, UA    4     Marijuana (THC) Metabolite  Negative       Toxicology Information  SEE COMMENT  Comment:  This screen includes the following classes of drugs at the   listed cut-off:  Benzodiazepines                  200 ng/ml  Methadone                        300 ng/ml  Cocaine metabolite               300 ng/ml  Opiates                          300 ng/ml  Barbiturates                     200 ng/ml  Amphetamines                    1000 ng/ml  Marijuana metabs (THC)            50 ng/ml  Phencyclidine (PCP)               25 ng/ml  High concentrations of Diphenhydramine may cross-react with  Phencyclidine PCP screening immunoassay giving a false   positive result.  High concentrations of Methylenedioxymethamphetamine (MDMA aka  Ectasy) and other structurally similar compounds may cross-   react with the Amphetamine/Methamphetamine screening   immunoassay giving a false positive result.  A metabolite of the anti-HIV drug Sustiva () may cause  false positive results in the Marijuana metabolite (THC)   screening assay.  Note: This exception list includes only more common   interferants in toxicology screen testing.  Because of many    cross-reactantspositive results on toxicology drug screens   should be confirmed whenever results do not correlate with   clinical presentation.  This report is intended for use in clinical monitoring and  management of patients. It is not intended for use in   employment related drug testing.  Because of any cross-reactants, positive results on toxicology  drug screens should be confirmed whenever results do not  correlate with clinical presentation.  Presumptive positive results are unconfirmed and may be used   only for medical purposes.         Urobilinogen, UA    Negative     WBC, UA    2     WBC 10.64            All pertinent labs within the past 24 hours have been reviewed.    Significant Imaging: I have reviewed all pertinent imaging results/findings within the past 24 hours.

## 2017-10-18 NOTE — ED TRIAGE NOTES
"Pt states, "I took too many xanax."  Pt denies trying to harm herself by taking the xanax.  Pt states, "I was just trying to sleep."  Denies any SI or HI.  Denies any previous suicide attempts.  "Getting a divorce and just wanted to sleep until he left."  Pt's sister reports pt told her she was taking 10-15 xanax every 6 hours yesterday.  Pt also c/o bilateral flank pain.  Has also had two falls since yesterday.  "

## 2017-10-18 NOTE — ASSESSMENT & PLAN NOTE
Patient with an adjustment reaction to psychosocial factors with .  Reacted impulsively and has been overmedicating for the past couple of days.  Agree with PEC and 1:1 sitter.  Notify  of commitment process.  Seek acute inpatient psychiatric facility when medically cleared.  Was able to speak with [facility] and they may have a female bed available.  Needs to be educated on proper coping skills and not to take Xanax or overuse medication in time of stress.

## 2017-10-18 NOTE — HPI
"Patient Rosie Izaguirre was brought to the hospital by her sisters who were concerned for her behaviors for the past couple of days.  Patient states that she got into a fight with her  on Sunday which ended in a decision to split up.  He is leaving this Friday to go back to work [place].  She decided to "sleep" until he left.  She took 10-15 Xanax 0.5mg tablets on Monday, yesterday, and again today.  She has been in bed for 3 days and has not gone to work.  Patient is interviewed in her room and is noted to be slurred and somewhat slowed in her responses.  She admits to about a 15 year history of depression treated by an outpatient psychiatrist (Oscar Mckeon MD) for the duration.  She admits to being "fine" until this weekend with an occasional dip in her mood "about every couple of months".  She has been sad, depressed, and lacking motivation since Sunday.  The fight with her  was over the fact that he was purchasing marijuana and she in not in agreement with drug use.  She denies any anxiety, manic, or psychotic history.  Biggest complaint is sleep.  Has been on zolpidem for years.  Also takes Effexor XR 225mg daily for about 9 years.  In the past has failed Paxil and Lexapro.  He has also prescribed her Xanax but no prescriptions written in past year or so.  He also treats her daughter (Aspergers and anxiety) and does write for her Xanax also.  This information was obtained in a conversation with Dr. Mckeon on the phone and he asks that primary treatment team contact him prior to discharge to coordinate outpatient follow up.  Patient and her psychiatrist deny any previous suicide attempts or self harm.  Says that the only gun in her home is an "airsoft" gun.  Note that her blood pressure is elevated.  "

## 2017-10-18 NOTE — TELEPHONE ENCOUNTER
Spoke with patient sister (FEDE) she believes patient may have taking to much of her medication (XANAX, and AMBIEN) spoke with Dr. Mai per Dr. Mai he example to patient mom take her to the ER. SISTER NOTIFY

## 2017-10-19 PROBLEM — F43.25 ADJUSTMENT DISORDER WITH MIXED DISTURBANCE OF EMOTIONS AND CONDUCT: Status: ACTIVE | Noted: 2017-10-19

## 2017-10-19 PROBLEM — F13.20 SEDATIVE HYPNOTIC OR ANXIOLYTIC DEPENDENCE: Chronic | Status: ACTIVE | Noted: 2017-10-19

## 2017-10-19 PROBLEM — F13.20 BENZODIAZEPINE DEPENDENCE, CONTINUOUS: Status: ACTIVE | Noted: 2017-10-19

## 2017-10-19 PROCEDURE — 12400001 HC PSYCH SEMI-PRIVATE ROOM

## 2017-10-19 PROCEDURE — 97166 OT EVAL MOD COMPLEX 45 MIN: CPT

## 2017-10-19 PROCEDURE — 25000003 PHARM REV CODE 250: Performed by: PSYCHIATRY & NEUROLOGY

## 2017-10-19 PROCEDURE — 99223 1ST HOSP IP/OBS HIGH 75: CPT | Mod: ,,, | Performed by: PSYCHIATRY & NEUROLOGY

## 2017-10-19 RX ORDER — FAMOTIDINE 20 MG/1
20 TABLET, FILM COATED ORAL 2 TIMES DAILY PRN
Status: DISCONTINUED | OUTPATIENT
Start: 2017-10-19 | End: 2017-10-24 | Stop reason: HOSPADM

## 2017-10-19 RX ORDER — CALCIUM CARBONATE 200(500)MG
500 TABLET,CHEWABLE ORAL 3 TIMES DAILY PRN
Status: DISCONTINUED | OUTPATIENT
Start: 2017-10-19 | End: 2017-10-24 | Stop reason: HOSPADM

## 2017-10-19 RX ORDER — VENLAFAXINE HYDROCHLORIDE 75 MG/1
225 CAPSULE, EXTENDED RELEASE ORAL DAILY
Status: DISCONTINUED | OUTPATIENT
Start: 2017-10-19 | End: 2017-10-24 | Stop reason: HOSPADM

## 2017-10-19 RX ORDER — ADHESIVE BANDAGE
30 BANDAGE TOPICAL DAILY PRN
Status: DISCONTINUED | OUTPATIENT
Start: 2017-10-19 | End: 2017-10-24 | Stop reason: HOSPADM

## 2017-10-19 RX ORDER — HYDROXYZINE PAMOATE 50 MG/1
50 CAPSULE ORAL NIGHTLY PRN
Status: DISCONTINUED | OUTPATIENT
Start: 2017-10-19 | End: 2017-10-24 | Stop reason: HOSPADM

## 2017-10-19 RX ORDER — LISINOPRIL 2.5 MG/1
5 TABLET ORAL DAILY
Status: DISCONTINUED | OUTPATIENT
Start: 2017-10-19 | End: 2017-10-24 | Stop reason: HOSPADM

## 2017-10-19 RX ORDER — HYDROXYZINE PAMOATE 25 MG/1
25 CAPSULE ORAL EVERY 8 HOURS PRN
Status: DISCONTINUED | OUTPATIENT
Start: 2017-10-19 | End: 2017-10-24 | Stop reason: HOSPADM

## 2017-10-19 RX ORDER — LORAZEPAM 1 MG/1
2 TABLET ORAL EVERY 4 HOURS PRN
Status: DISCONTINUED | OUTPATIENT
Start: 2017-10-19 | End: 2017-10-24 | Stop reason: HOSPADM

## 2017-10-19 RX ORDER — LOPERAMIDE HYDROCHLORIDE 2 MG/1
2 CAPSULE ORAL 4 TIMES DAILY PRN
Status: DISCONTINUED | OUTPATIENT
Start: 2017-10-19 | End: 2017-10-24 | Stop reason: HOSPADM

## 2017-10-19 RX ORDER — FERROUS SULFATE 325(65) MG
325 TABLET, DELAYED RELEASE (ENTERIC COATED) ORAL DAILY
Status: DISCONTINUED | OUTPATIENT
Start: 2017-10-19 | End: 2017-10-24 | Stop reason: HOSPADM

## 2017-10-19 RX ORDER — DIAZEPAM 5 MG/1
5 TABLET ORAL 3 TIMES DAILY
Status: COMPLETED | OUTPATIENT
Start: 2017-10-19 | End: 2017-10-20

## 2017-10-19 RX ADMIN — DIAZEPAM 5 MG: 5 TABLET ORAL at 02:10

## 2017-10-19 RX ADMIN — LISINOPRIL 5 MG: 2.5 TABLET ORAL at 12:10

## 2017-10-19 RX ADMIN — Medication 1 TABLET: at 02:10

## 2017-10-19 RX ADMIN — DIAZEPAM 5 MG: 5 TABLET ORAL at 08:10

## 2017-10-19 RX ADMIN — THERA TABS 1 TABLET: TAB at 12:10

## 2017-10-19 RX ADMIN — HYDROXYZINE PAMOATE 50 MG: 50 CAPSULE ORAL at 08:10

## 2017-10-19 RX ADMIN — FERROUS SULFATE TAB EC 325 MG (65 MG FE EQUIVALENT) 325 MG: 325 (65 FE) TABLET DELAYED RESPONSE at 12:10

## 2017-10-19 RX ADMIN — VENLAFAXINE HYDROCHLORIDE 225 MG: 75 CAPSULE, EXTENDED RELEASE ORAL at 12:10

## 2017-10-19 NOTE — PLAN OF CARE
Problem: Patient Care Overview (Adult)  Goal: Plan of Care Review  Outcome: Ongoing (interventions implemented as appropriate)  POC discussed with pt, calm and cooperative on the unit. Follows direction she did not attend group r/t social anxiety. Med compliant, good hygiene,and fair appetite. Denies SI/HI/AVH, flat affect, thoughts are relevent . Mood is sad and depressed. Out visible on the unit. Safety plan reviewed and environmental rounds done. Reviewed medicine with pt will require further instruction. Pt given time to ask questions, all questions answered. MVC in place will continue to monitor.     Problem: Depression (Adult,Obstetrics,Pediatric)  Goal: Establish/Maintain Self-Care Routine  Patient will demonstrate the desired outcomes by discharge/transition of care.  Outcome: Ongoing (interventions implemented as appropriate)  Pt has good hygiene and did take care of herself.   Goal: Improved/Stable Mood  Patient will demonstrate the desired outcomes by discharge/transition of care.  Outcome: Ongoing (interventions implemented as appropriate)  Pt endorses feelings of depression but denies SI/HI's.

## 2017-10-19 NOTE — PROGRESS NOTES
Pt slept 8 hours she remains calm and cooperative throughout the evening. MVC in place will continue to monitor.

## 2017-10-19 NOTE — ED NOTES
Received report from David.  Pt laying in bed, in no acute distress.  Will continue to monitor.  Pts family members at bedside.

## 2017-10-19 NOTE — HPI
"HPI:  Per initial consult note:  Patient Rosie Izaguirre was brought to the hospital by her sisters who were concerned for her behaviors for the past couple of days.  Patient states that she got into a fight with her  on Sunday which ended in a decision to split up.  He is leaving this Friday to go back to work [place].  She decided to "sleep" until he left.  She took 10-15 Xanax 0.5mg tablets on Monday, yesterday, and again today.  She has been in bed for 3 days and has not gone to work.  Patient is interviewed in her room and is noted to be slurred and somewhat slowed in her responses.  She admits to about a 15 year history of depression treated by an outpatient psychiatrist (Oscar Mckeon MD) for the duration.  She admits to being "fine" until this weekend with an occasional dip in her mood "about every couple of months".  She has been sad, depressed, and lacking motivation since Sunday.  The fight with her  was over the fact that he was purchasing marijuana and she in not in agreement with drug use.  She denies any anxiety, manic, or psychotic history.  Biggest complaint is sleep.  Has been on zolpidem for years.  Also takes Effexor XR 225mg daily for about 9 years.  In the past has failed Paxil and Lexapro.  He has also prescribed her Xanax but no prescriptions written in past year or so.  He also treats her daughter (Aspergers and anxiety) and does write for her Xanax also.  This information was obtained in a conversation with Dr. Mckeon on the phone and he asks that primary treatment team contact him prior to discharge to coordinate outpatient follow up.  Patient and her psychiatrist deny any previous suicide attempts or self harm.  Says that the only gun in her home is an "airsoft" gun.  Note that her blood pressure is elevated.    Inpatient psychiatric interview:  She reports her sisters brought her in to the hospital today because they knew something was wrong. She states on Sunday she and her " " started fighting because she found out he had been buying weed. She states this escalated in verbal arguments until they decided to get . She states for the past 3 days she has been trying to sleep through the pain because she didn't want to deal with it. So she took 10-15 xanax 0.5 mg tablets per day in order to sleep constantly; last took xanax yesterday afternoon. She denies having any intention of trying to harm or kill herself. She states prior to this acute episode she had been doing well but can recall a recent MDE lasting about 2 weeks in May of this year but was able to "bounce back." She reports she has been complaint with home dose of Effexor. Denies any prior hospitalizations or prior suicide attempts. Denies history of substance abuse or rehab. Denies having any other medical problems. She is wearing a splint on her right pinky finger due punching the wall on Sunday and was diagnosed with a broken finger.    Collateral Information:  Cynthia Palma, sister ([phone]), obtained by Britt Harden: :   Sees patient almost daily; lives within walking distance. Patient doing worse over past few months, possibly longer. Slowly stopped taking care of responsibilities (housework, being on time). Decreased visits with sister. Sister does not believe patient would attempt to hurt self (accidental or on purpose) or hurt others.      Progressively more miserable over past months.   1. Reactions out of proportion with situation: divorce and sleep until  leaves because she is upset with    2. 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 Negativity: stating "if they can't make it a whole week without me, I should leave," despite the fact that the boss is understanding and gives pt leeway      Possibly triggered by:  1.  working in [facility] last 4-5 months; patient not adjusting well. 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 Possibly jealousy when  is gone.   2. 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 Daughter started college in August and moved away from home.  4.  not dealing well with increased depression. (ongoing for past month, not only since he came home). Per sister,  would like marriage counseling; no divorce papers/etc have been started.      Psych History:  Patient has been on antidepressants for last 18 years. Effexor for last 12. Tried other meds before. Patient has mood dips, but usually can compartmentalize and pretend like nothing is going on. Sister unsure if mood dips are happening more frequently. Patient has never "accidentally" or intentionally overdosed, never been hospitalized for psychiatric condition.     Patient used to cope with stress/anxiety/depression with medication and by talking with family (sisters, parents, grandma live nearby). More recently, patient joselin by avoidance - lie in bed and play games on phone.      Family Psych History:  Sister - mixed bipolar (currently on lamictal, seroquel, previously on zoloft).   Grandma's mother - possibly bipolar  Grandma - depression  Mother and mother's four sisters - on antidepressants at some point (mother has been on cymbalta for past few years)      Sister's Concerns:   1. Get patient more "in touch with reality"     2. Possible non-medication therapy for patient as adjunct to meds?     3. Is current med (effexor) not working anymore; change med?     4. Is patient possibly mixed bipolar also?     5. 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       (Sofie Izaguirre [phone], [phone]), obtained by Britt Harden:   Works offshore, depending " on length of projects, generally sees patient for 1 week durations every 2-3 weeks; have been together for 23 years.  denies patient ever attempting suicide or accidentally taking too many pills. Denies that patient would actively or passively attempt suicide.  believes problems started in August, when he failed drug test (adderall). He reports that the patient had a downward trend at that time, as she was upset and hurt, and didn't want to talk with him. 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 However, he feels that this most recent incident (marijuana) resulted in the patient's loss of trust in him, and precipitated her increased depression. He blames self for patient's current condition. He reports motivation to regain trust/seek counseling.  He reports that he attempted to visit patient yesterday while patient was still at [facility], but was turned away by nursing. He is unsure of the reason.      's concerns:  1. When will pt be discharged home? He would like for her condition to improve, but is trying to figure out if he can go back [place] Saturday.  2. Can he speak with patient? (re: he was turned away at [facility])     PPH:  -Outpatient psychiatrist: Oscar Pompa, follows up q 3 months  -no prior suicide attempts  -diagnosed with depression at 21 years of age  -hospitalizations: none  -medication trials: celexa, lexapro, has been on Effexor 225 mg PO qd for the last 15 years. 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

## 2017-10-19 NOTE — PROGRESS NOTES
10/18/17 2000   [facility] Group Therapy   Group Name Other   Specific Interventions (social group)   Participation Level None  (new patient on the unit)

## 2017-10-19 NOTE — NURSING
Patient is out in the milieu.  Dressed and social with select peers.  Cooperative with rules and routines.  Attending groups.  Cont to report depressed mood.  Denies any suicidal ideations.  Report has to work or dealing with stressors with her marriage.

## 2017-10-19 NOTE — PLAN OF CARE
Problem: Patient Care Overview (Adult)  Goal: Plan of Care Review  Outcome: Ongoing (interventions implemented as appropriate)  Patient is dressed and visible on the unit.  Playing cards with student nurses.  Pleasant upon approach.  Mood is depressed/sad.  Patient is cooperative with medication and plan of care.  Continues to report that she was not suicidal  Does report a depressed mood with no current suicidal ideations.     Problem: Depression (Adult,Obstetrics,Pediatric)  Goal: Establish/Maintain Self-Care Routine  Patient will demonstrate the desired outcomes by discharge/transition of care.   Outcome: Ongoing (interventions implemented as appropriate)  Patient is dressed is street clothes .  Self care routines adequate.   Goal: Improved/Stable Mood  Patient will demonstrate the desired outcomes by discharge/transition of care.   Outcome: Ongoing (interventions implemented as appropriate)  Mood is sad and depressed.
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
## 2017-10-19 NOTE — MEDICAL/APP STUDENT
"Collateral Information:    Cynthia Palma, sister ([phone]):   Sees patient almost daily; lives within walking distance. Patient doing worse over past few months, possibly longer. Slowly stopped taking care of responsibilities (housework, being on time). Decreased visits with sister. Sister does not believe patient would attempt to hurt self (accidental or on purpose) or hurt others.     Progressively more miserable over past months.   1. Reactions out of proportion with situation: divorce and sleep until  leaves because she is upset with    2. 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 Negativity: stating "if they can't make it a whole week without me, I should leave," despite the fact that the boss is understanding and gives pt leeway     Possibly triggered by:  1.  working in [facility] last 4-5 months; patient not adjusting well. 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 Possibly jealousy when  is gone.   2. 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 Daughter started college in August and moved away from home.  4.  not dealing well with increased depression. (ongoing for past month, not only since he came home). Per sister,  would like marriage counseling; no divorce papers/etc have been started.     Psych History:  Patient has been on antidepressants for last 18 years. Effexor for last 12. Tried other meds before. Patient has mood dips, but usually can compartmentalize and pretend like nothing is going on. Sister unsure if mood dips are happening more frequently. Patient has never "accidentally" or intentionally overdosed, never been hospitalized for psychiatric condition.    Patient used to cope with " "stress/anxiety/depression with medication and by talking with family (sisters, parents, grandma live nearby). More recently, patient joselin by avoidance - lie in bed and play games on phone.     Family Psych History:  Sister - mixed bipolar (currently on lamictal, seroquel, previously on zoloft).   Grandma's mother - possibly bipolar  Grandma - depression  Mother and mother's four sisters - on antidepressants at some point (mother has been on cymbalta for past few years)     Sister's Concerns:   1. Get patient more "in touch with reality"    2. Possible non-medication therapy for patient as adjunct to meds?    3. Is current med (effexor) not working anymore; change med?    4. Is patient possibly mixed bipolar also?    5. 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      (Sofie Izaguirre [phone], [phone]):   Works offshore, depending on length of projects, generally sees patient for 1 week durations every 2-3 weeks; have been together for 23 years.  denies patient ever attempting suicide or accidentally taking too many pills. Denies that patient would actively or passively attempt suicide.  believes problems started in August, when he failed drug test (adderall). He reports that the patient had a downward trend at that time, as she was upset and hurt, and didn't want to talk with him. 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 However, he feels that this most recent incident (marijuana) resulted in the patient's loss of trust in him, and precipitated her increased depression. He blames self for patient's current condition. He reports motivation to regain trust/seek counseling.  He reports that he attempted to visit patient yesterday while patient was still at [facility], but was turned away by nursing. He is unsure of the reason. "     's concerns:  1. When will pt be discharged home? He would like for her condition to improve, but is trying to figure out if he can go back [place] Saturday.  2. Can he speak with patient? (re: he was turned away at [facility])       Toño Harden  L3 medical student

## 2017-10-19 NOTE — PROGRESS NOTES
10/18/17 2000   [facility] Group Therapy   Group Name Community Reintegration   Specific Interventions (social group)   Participation Level None  (new patient on the unit)

## 2017-10-19 NOTE — ASSESSMENT & PLAN NOTE
-continue PEC and inpatient psychiatric hospitalization for danger to self  -resume home dose of Effexor in Am, consider addition of mood stabilizer  - patient on dangers of misuse of medications

## 2017-10-19 NOTE — H&P
"[facility]  Psychiatry  History & Physical    Patient Name: Rosie Izaguirre  MRN: [id number]   Code Status: No Order  Admission Date: 10/18/2017  Attending Physician: Robin Haskins MD   Primary Care Provider: Hang Mai MD    Current Legal Status: [facility]    Patient information was obtained from patient and ER records.     Subjective:      Chief Complaint:  overdose     HPI: HPI:  Per initial consult note:  Patient Rosie Izaguirre was brought to the hospital by her sisters who were concerned for her behaviors for the past couple of days.  Patient states that she got into a fight with her  on Sunday which ended in a decision to split up.  He is leaving this Friday to [place].  She decided to "sleep" until he left.  She took 10-15 Xanax 0.5mg tablets on Monday, yesterday, and again today.  She has been in bed for 3 days and has not gone to work.  Patient is interviewed in her room and is noted to be slurred and somewhat slowed in her responses.  She admits to about a 15 year history of depression treated by an outpatient psychiatrist (Oscar Mckeon MD) for the duration.  She admits to being "fine" until this weekend with an occasional dip in her mood "about every couple of months".  She has been sad, depressed, and lacking motivation since Sunday.  The fight with her  was over the fact that he was purchasing marijuana and she in not in agreement with drug use.  She denies any anxiety, manic, or psychotic history.  Biggest complaint is sleep.  Has been on zolpidem for years.  Also takes Effexor XR 225mg daily for about 9 years.  In the past has failed Paxil and Lexapro.  He has also prescribed her Xanax but no prescriptions written in past year or so.  He also treats her daughter (Aspergers and anxiety) and does write for her Xanax also.  This information was obtained in a conversation with Dr. Mckeon on the phone and he asks that primary treatment team contact him prior " "to discharge to coordinate outpatient follow up.  Patient and her psychiatrist deny any previous suicide attempts or self harm.  Says that the only gun in her home is an "airsoft" gun.  Note that her blood pressure is elevated.    Inpatient psychiatric interview:  She reports her sisters brought her in to the hospital today because they knew something was wrong. She states on Sunday she and her  started fighting because she found out he had been buying weed. She states this escalated in verbal arguments until they decided to get . She states for the past 3 days she has been trying to sleep through the pain because she didn't want to deal with it. So she took 10-15 xanax 0.5 mg tablets per day in order to sleep constantly; last took xanax yesterday afternoon. She denies having any intention of trying to harm or kill herself. She states prior to this acute episode she had been doing well but can recall a recent MDE lasting about 2 weeks in May of this year but was able to "bounce back." She reports she has been complaint with home dose of Effexor. Denies any prior hospitalizations or prior suicide attempts. Denies history of substance abuse or rehab. Denies having any other medical problems. She is wearing a splint on her right pinky finger due punching the wall on Sunday and was diagnosed with a broken finger.    PPH:  -Outpatient psychiatrist: Oscar Pompa, follows up q 3 months  -no prior suicide attempts  -diagnosed with depression at 21 years of age  -hospitalizations: none  -medication trials: celexa, lexapro, has been on Effexor 225 mg PO qd for the last 15 years. 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  -has attended some college, AP classes  -Denies substance abuse, rehab  -lives in [facility] with  and children    FH  -sister with bipolar           Patient " History           Medical as of 10/18/2017     Past Medical History     Diagnosis Date Comments Source    Hx of psychiatric care -- -- Provider    Hypertension -- -- Provider    Psychiatric problem -- -- Provider    Therapy -- -- Provider          Pertinent Negatives     Diagnosis Date Noted Comments Source    History of psychiatric hospitalization 10/18/2017 -- Provider    Suicide attempt 10/18/2017 -- Provider                  Surgical as of 10/18/2017     Past Surgical History     Procedure Laterality Date Comments Source    APPENDECTOMY -- -- -- Provider                  Family as of 10/18/2017     Problem Relation Name Age of Onset Comments Source    Hypertension Father -- -- -- Provider    Anxiety disorder Daughter -- -- -- Provider    Asperger's syndrome Daughter -- -- -- Provider            Tobacco Use as of 10/18/2017     Smoking Status Smoking Start Date Smoking Quit Date Packs/day Years Used    Never Smoker -- -- -- --    Types Comments Smokeless Tobacco Status Smokeless Tobacco Quit Date Source    -- -- Never Used -- Provider            Alcohol Use as of 10/18/2017     Alcohol Use Drinks/Week Alcohol/Week Comments Source    No -- -- -- Provider            Drug Use as of 10/18/2017     Drug Use Types Frequency Comments Source    No -- -- -- Provider            Sexual Activity as of 10/18/2017     Sexually Active Birth Control Partners Comments Source    Yes -- Male -- Provider            Activities of Daily Living as of 10/18/2017     Activities of Daily Living Question Response Comments Source    Patient feels they ought to cut down on drinking/drug use Not Asked -- Provider    Patient annoyed by others criticizing their drinking/drug use Not Asked -- Provider    Patient has felt bad or guilty about drinking/drug use Not Asked -- Provider    Patient has had a drink/used drugs as an eye opener in the AM Not Asked -- Provider            Social Documentation as of 10/18/2017    **None**          "  Occupational as of 10/18/2017    **None**           Socioeconomic as of 10/18/2017     Marital Status Spouse Name Number of Children Years Education Preferred Language Ethnicity Race Source     -- 2 -- English /White White Provider         Pertinent History Q A Comments    as of 10/18/2017 Lives with family     Place in Birth Order      Lives in home     Number of Siblings      Raised by      Legal Involvement      Childhood Trauma      Criminal History of      Financial Status employed cares for preschoolers    Highest Level of Education      Does patient have access to a firearm? No "airsoft" gun     Service      Primary Leisure Activity      Spirituality       Past Medical History:   Diagnosis Date    Hx of psychiatric care     Hypertension     Psychiatric problem     Therapy      Past Surgical History:   Procedure Laterality Date    APPENDECTOMY       Family History     Problem Relation (Age of Onset)    Anxiety disorder Daughter    Asperger's syndrome Daughter    Hypertension Father        Social History Main Topics    Smoking status: Never Smoker    Smokeless tobacco: Never Used    Alcohol use No    Drug use: No    Sexual activity: Yes     Partners: Male     Review of patient's allergies indicates:  No Known Allergies    Current Facility-Administered Medications on File Prior to Encounter   Medication    [COMPLETED] acetaminophen tablet 1,000 mg    [COMPLETED] ketorolac injection 30 mg     Current Outpatient Prescriptions on File Prior to Encounter   Medication Sig    alprazolam (XANAX) 0.5 MG tablet Take 0.5 mg by mouth daily as needed for Anxiety.    cloNIDine (CATAPRES) 0.1 MG tablet Take 1 tablet (0.1 mg total) by mouth once. Prior to dentist    ferrous sulfate 325 mg (65 mg iron) Tab tablet Take 1 tablet (325 mg total) by mouth once daily.    lidocaine (LIDODERM) 5 % Place 1 patch onto the skin once daily. Remove & Discard patch within 12 hours or as directed by " MD    lisinopril (PRINIVIL,ZESTRIL) 5 MG tablet Take 1 tablet (5 mg total) by mouth once daily.    ranitidine (ZANTAC) 300 MG tablet Take 1 tablet (300 mg total) by mouth 2 (two) times daily.    VENLAFAXINE HCL (EFFEXOR ORAL) Take by mouth.    zolpidem (AMBIEN) 10 mg Tab Take 5 mg by mouth nightly as needed.     Psychotherapeutics     None        Review of Systems  Review of Systems   Constitutional: Negative for chills and fever.   Cardiovascular: Negative for chest pain.   Gastrointestinal: Negative for abdominal pain, constipation, diarrhea and vomiting.   Skin: Negative for rash.   Neurological: Positive for headaches.   Psychiatric/Behavioral: Negative for substance abuse and suicidal ideas.         Strengths and Liabilities: Strength: Patient accepts guidance/feedback, Strength: Patient is expressive/articulate.    Objective:     Vital Signs (Most Recent):  Temp: 98.1 °F (36.7 °C) (10/18/17 2030)  Pulse: 88 (10/18/17 2030)  Resp: 16 (10/18/17 2030)  BP: 133/86 (10/18/17 2030) Vital Signs (24h Range):  Temp:  [98.1 °F (36.7 °C)-98.5 °F (36.9 °C)] 98.1 °F (36.7 °C)  Pulse:  [] 88  Resp:  [16] 16  SpO2:  [100 %] 100 %  BP: (128-133)/(77-86) 133/86     Height: 5' (152.4 cm)  Weight: 81.6 kg (180 lb)  Body mass index is 35.15 kg/m².    No intake or output data in the 24 hours ending 10/18/17 2101    Physical Exam   Constitutional: She is oriented to person, place, and time. She appears well-developed and well-nourished.   HENT:   Head: Normocephalic and atraumatic.   Eyes: EOM are normal. Pupils are equal, round, and reactive to light. Right eye exhibits no discharge. Left eye exhibits no discharge. No scleral icterus.   Neck: Normal range of motion. Neck supple.   Cardiovascular: Normal rate, regular rhythm and normal heart sounds.  Exam reveals no gallop and no friction rub.    No murmur heard.  Pulmonary/Chest: Effort normal. No stridor. No respiratory distress. She has no wheezes.   Musculoskeletal:  "She exhibits deformity (right 5th digit). She exhibits no edema.   Neurological: She is alert and oriented to person, place, and time. No cranial nerve deficit. Coordination normal.   Skin: Skin is warm and dry.     NEUROLOGICAL EXAMINATION:     MENTAL STATUS   Oriented to person, place, and time.     CRANIAL NERVES     CN III, IV, VI   Pupils are equal, round, and reactive to light.  Extraocular motions are normal.        CONSTITUTIONAL  General Appearance: in scrubs, NAD, calm, cooperative    PSYCHIATRIC   Level of Consciousness: alert  Orientation: oriented to person, place, situation  Grooming: fair  Psychomotor Behavior: no agitation, retardation  Speech: normal rate, volume, tone  Language: English, no asphasia  Mood: "ok"  Affect: full, reactive  Thought Process: linear, logical  Associations: cohesive  Thought Content: denies SI  Memory: intact to recent and remote events  Attention: not distracted  Fund of Knowledge: appropriate for education level  Insight: fair  Judgment: limited    Significant Labs:   Last 24 Hours:   Recent Lab Results       10/18/17  1350 10/18/17  1347 10/18/17  1346 10/18/17  1340      Benzodiazepines  Presumptive Positive       Methadone metabolites  Negative       Phencyclidine  Negative       Acetaminophen (Tylenol), Serum <3.0  Comment:  Toxic Levels:  Adults (4 hr post-ingestion).........>150 ug/mL  Adults (12 hr post-ingestion)........>40 ug/mL  Peds (2 hr post-ingestion, liquid)...>225 ug/mL  (L)        Albumin 3.7        Alcohol, Medical, Serum <10        Alkaline Phosphatase 104        ALT 20        Amphetamine Screen, Ur  Negative       Anion Gap 7(L)        Appearance, UA    Hazy(A)     AST 24        Bacteria, UA    Rare     Barbiturate Screen, Ur  Negative       Baso # 0.02        Basophil% 0.2        Bilirubin (UA)    Negative     Total Bilirubin 0.3  Comment:  For infants and newborns, interpretation of results should be based  on gestational age, weight and in " agreement with clinical  observations.  Premature Infant recommended reference ranges:  Up to 24 hours.............<8.0 mg/dL  Up to 48 hours............<12.0 mg/dL  3-5 days..................<15.0 mg/dL  6-29 days.................<15.0 mg/dL          BUN, Bld 8        Calcium 9.6        Chloride 104        CO2 28        Cocaine (Metab.)  Negative       Color, UA    Yellow     Creatinine 0.8        Creatinine, Random Ur  127.0  Comment:  The random urine reference ranges provided were established   for 24 hour urine collections.  No reference ranges exist for  random urine specimens.  Correlate clinically.         Differential Method Automated        eGFR if  >60        eGFR if non  >60  Comment:  Calculation used to obtain the estimated glomerular filtration  rate (eGFR) is the CKD-EPI equation. Since race is unknown   in our information system, the eGFR values for   -American and Non--American patients are given   for each creatinine result.          Eos # 0.0        Eosinophil% 0.3        Glucose 77        Glucose, UA    Negative     Gran # 7.2        Gran% 67.2        Hematocrit 35.4(L)        Hemoglobin 11.5(L)        Ketones, UA    1+(A)     Leukocytes, UA    2+(A)     Lymph # 2.8        Lymph% 26.0        MCH 26.4(L)        MCHC 32.5        MCV 81(L)        Microscopic Comment    SEE COMMENT  Comment:  Other formed elements not mentioned in the report are not   present in the microscopic examination.        Mono # 0.7        Mono% 6.3        MPV 9.9        Nitrite, UA    Negative     Occult Blood UA    Negative     Opiate Scrn, Ur  Negative       pH, UA    6.0     Platelets 500(H)        Potassium 3.5        Preg Test, Ur   Negative      Total Protein 7.8        Protein, UA    Negative  Comment:  Recommend a 24 hour urine protein or a urine   protein/creatinine ratio if globulin induced proteinuria is  clinically suspected.        Acceptable   Yes       RBC 4.36        RDW 14.6(H)        Sodium 139        Specific [place], UA    1.010     Specimen UA    Urine, Clean Catch     Squam Epithel, UA    4     Marijuana (THC) Metabolite  Negative       Toxicology Information  SEE COMMENT  Comment:  This screen includes the following classes of drugs at the   listed cut-off:  Benzodiazepines                  200 ng/ml  Methadone                        300 ng/ml  Cocaine metabolite               300 ng/ml  Opiates                          300 ng/ml  Barbiturates                     200 ng/ml  Amphetamines                    1000 ng/ml  Marijuana metabs (THC)            50 ng/ml  Phencyclidine (PCP)               25 ng/ml  High concentrations of Diphenhydramine may cross-react with  Phencyclidine PCP screening immunoassay giving a false   positive result.  High concentrations of Methylenedioxymethamphetamine (MDMA aka  Ectasy) and other structurally similar compounds may cross-   react with the Amphetamine/Methamphetamine screening   immunoassay giving a false positive result.  A metabolite of the anti-HIV drug Sustiva () may cause  false positive results in the Marijuana metabolite (THC)   screening assay.  Note: This exception list includes only more common   interferants in toxicology screen testing.  Because of many   cross-reactantspositive results on toxicology drug screens   should be confirmed whenever results do not correlate with   clinical presentation.  This report is intended for use in clinical monitoring and  management of patients. It is not intended for use in   employment related drug testing.  Because of any cross-reactants, positive results on toxicology  drug screens should be confirmed whenever results do not  correlate with clinical presentation.  Presumptive positive results are unconfirmed and may be used   only for medical purposes.         TSH 2.954        Urobilinogen, UA    Negative     WBC, UA    2     WBC 10.64              Significant  Imaging: I have reviewed all pertinent imaging results/findings within the past 24 hours.    Assessment/Plan:     Suicide attempt by substance overdose    -continue PEC and inpatient psychiatric hospitalization for danger to self  -resume home dose of Effexor in Am, consider addition of mood stabilizer  - patient on dangers of misuse of medications             Estimated Discharge Date:   Initial Discharge Plan: Home    Prognosis: Fair    Need for Continued Hospitalization:   Psychiatric illness continues to pose a potential threat to life or bodily function, of self or others, thereby requiring the need for continued inpatient psychiatric hospitalization.    Miguelangel Wang MD   Psychiatry  [facility]

## 2017-10-19 NOTE — PROGRESS NOTES
Pt admitted to the unit she is calm and cooperative, following directions. Belongings checked in and secured, MD notified, coroners office notified, signed legal documents and oriented to the unit. Introduced to staff checked for contraband and allowed to take a shower changed into unit scrubs. Denies SI/HI/AH, flat and guarded affect, mood is sad. Safety plan developed and reviewed with patient. Environmental rounds completed MVC in place will continue to monitor.

## 2017-10-19 NOTE — SUBJECTIVE & OBJECTIVE
Patient History           Medical as of 10/18/2017     Past Medical History     Diagnosis Date Comments Source    Hx of psychiatric care -- -- Provider    Hypertension -- -- Provider    Psychiatric problem -- -- Provider    Therapy -- -- Provider          Pertinent Negatives     Diagnosis Date Noted Comments Source    History of psychiatric hospitalization 10/18/2017 -- Provider    Suicide attempt 10/18/2017 -- Provider                  Surgical as of 10/18/2017     Past Surgical History     Procedure Laterality Date Comments Source    APPENDECTOMY -- -- -- Provider                  Family as of 10/18/2017     Problem Relation Name Age of Onset Comments Source    Hypertension Father -- -- -- Provider    Anxiety disorder Daughter -- -- -- Provider    Asperger's syndrome Daughter -- -- -- Provider            Tobacco Use as of 10/18/2017     Smoking Status Smoking Start Date Smoking Quit Date Packs/day Years Used    Never Smoker -- -- -- --    Types Comments Smokeless Tobacco Status Smokeless Tobacco Quit Date Source    -- -- Never Used -- Provider            Alcohol Use as of 10/18/2017     Alcohol Use Drinks/Week Alcohol/Week Comments Source    No -- -- -- Provider            Drug Use as of 10/18/2017     Drug Use Types Frequency Comments Source    No -- -- -- Provider            Sexual Activity as of 10/18/2017     Sexually Active Birth Control Partners Comments Source    Yes -- Male -- Provider            Activities of Daily Living as of 10/18/2017     Activities of Daily Living Question Response Comments Source    Patient feels they ought to cut down on drinking/drug use Not Asked -- Provider    Patient annoyed by others criticizing their drinking/drug use Not Asked -- Provider    Patient has felt bad or guilty about drinking/drug use Not Asked -- Provider    Patient has had a drink/used drugs as an eye opener in the AM Not Asked -- Provider            Social Documentation as of 10/18/2017    **None**          "  Occupational as of 10/18/2017    **None**           Socioeconomic as of 10/18/2017     Marital Status Spouse Name Number of Children Years Education Preferred Language Ethnicity Race Source     -- 2 -- English /White White Provider         Pertinent History Q A Comments    as of 10/18/2017 Lives with family     Place in Birth Order      Lives in home     Number of Siblings      Raised by      Legal Involvement      Childhood Trauma      Criminal History of      Financial Status employed cares for preschoolers    Highest Level of Education      Does patient have access to a firearm? No "airsoft" gun     Service      Primary Leisure Activity      Spirituality       Past Medical History:   Diagnosis Date    Hx of psychiatric care     Hypertension     Psychiatric problem     Therapy      Past Surgical History:   Procedure Laterality Date    APPENDECTOMY       Family History     Problem Relation (Age of Onset)    Anxiety disorder Daughter    Asperger's syndrome Daughter    Hypertension Father        Social History Main Topics    Smoking status: Never Smoker    Smokeless tobacco: Never Used    Alcohol use No    Drug use: No    Sexual activity: Yes     Partners: Male     Review of patient's allergies indicates:  No Known Allergies    Current Facility-Administered Medications on File Prior to Encounter   Medication    [COMPLETED] acetaminophen tablet 1,000 mg    [COMPLETED] ketorolac injection 30 mg     Current Outpatient Prescriptions on File Prior to Encounter   Medication Sig    alprazolam (XANAX) 0.5 MG tablet Take 0.5 mg by mouth daily as needed for Anxiety.    cloNIDine (CATAPRES) 0.1 MG tablet Take 1 tablet (0.1 mg total) by mouth once. Prior to dentist    ferrous sulfate 325 mg (65 mg iron) Tab tablet Take 1 tablet (325 mg total) by mouth once daily.    lidocaine (LIDODERM) 5 % Place 1 patch onto the skin once daily. Remove & Discard patch within 12 hours or as directed by " MD    lisinopril (PRINIVIL,ZESTRIL) 5 MG tablet Take 1 tablet (5 mg total) by mouth once daily.    ranitidine (ZANTAC) 300 MG tablet Take 1 tablet (300 mg total) by mouth 2 (two) times daily.    VENLAFAXINE HCL (EFFEXOR ORAL) Take by mouth.    zolpidem (AMBIEN) 10 mg Tab Take 5 mg by mouth nightly as needed.     Psychotherapeutics     None        Review of Systems  Strengths and Liabilities: Strength: Patient accepts guidance/feedback, Strength: Patient is expressive/articulate.    Objective:     Vital Signs (Most Recent):  Temp: 98.1 °F (36.7 °C) (10/18/17 2030)  Pulse: 88 (10/18/17 2030)  Resp: 16 (10/18/17 2030)  BP: 133/86 (10/18/17 2030) Vital Signs (24h Range):  Temp:  [98.1 °F (36.7 °C)-98.5 °F (36.9 °C)] 98.1 °F (36.7 °C)  Pulse:  [] 88  Resp:  [16] 16  SpO2:  [100 %] 100 %  BP: (128-133)/(77-86) 133/86     Height: 5' (152.4 cm)  Weight: 81.6 kg (180 lb)  Body mass index is 35.15 kg/m².    No intake or output data in the 24 hours ending 10/18/17 2101    Physical Exam   Constitutional: She is oriented to person, place, and time. She appears well-developed and well-nourished.   HENT:   Head: Normocephalic and atraumatic.   Eyes: EOM are normal. Pupils are equal, round, and reactive to light. Right eye exhibits no discharge. Left eye exhibits no discharge. No scleral icterus.   Neck: Normal range of motion. Neck supple.   Cardiovascular: Normal rate, regular rhythm and normal heart sounds.  Exam reveals no gallop and no friction rub.    No murmur heard.  Pulmonary/Chest: Effort normal. No stridor. No respiratory distress. She has no wheezes.   Musculoskeletal: She exhibits deformity (right 5th digit). She exhibits no edema.   Neurological: She is alert and oriented to person, place, and time. No cranial nerve deficit. Coordination normal.   Skin: Skin is warm and dry.     NEUROLOGICAL EXAMINATION:     MENTAL STATUS   Oriented to person, place, and time.     CRANIAL NERVES     CN III, IV, VI   Pupils  "are equal, round, and reactive to light.  Extraocular motions are normal.        CONSTITUTIONAL  General Appearance: in scrubs, NAD, calm, cooperative    PSYCHIATRIC   Level of Consciousness: alert  Orientation: oriented to person, place, situation  Grooming: fair  Psychomotor Behavior: no agitation, retardation  Speech: normal rate, volume, tone  Language: English, no asphasia  Mood: "ok"  Affect: full, reactive  Thought Process: linear, logical  Associations: cohesive  Thought Content: denies SI  Memory: intact to recent and remote events  Attention: not distracted  Fund of Knowledge: appropriate for education level  Insight: fair  Judgment: limited    Significant Labs:   Last 24 Hours:   Recent Lab Results       10/18/17  1350 10/18/17  1347 10/18/17  1346 10/18/17  1340      Benzodiazepines  Presumptive Positive       Methadone metabolites  Negative       Phencyclidine  Negative       Acetaminophen (Tylenol), Serum <3.0  Comment:  Toxic Levels:  Adults (4 hr post-ingestion).........>150 ug/mL  Adults (12 hr post-ingestion)........>40 ug/mL  Peds (2 hr post-ingestion, liquid)...>225 ug/mL  (L)        Albumin 3.7        Alcohol, Medical, Serum <10        Alkaline Phosphatase 104        ALT 20        Amphetamine Screen, Ur  Negative       Anion Gap 7(L)        Appearance, UA    Hazy(A)     AST 24        Bacteria, UA    Rare     Barbiturate Screen, Ur  Negative       Baso # 0.02        Basophil% 0.2        Bilirubin (UA)    Negative     Total Bilirubin 0.3  Comment:  For infants and newborns, interpretation of results should be based  on gestational age, weight and in agreement with clinical  observations.  Premature Infant recommended reference ranges:  Up to 24 hours.............<8.0 mg/dL  Up to 48 hours............<12.0 mg/dL  3-5 days..................<15.0 mg/dL  6-29 days.................<15.0 mg/dL          BUN, Bld 8        Calcium 9.6        Chloride 104        CO2 28        Cocaine (Metab.)  Negative    "    Color, UA    Yellow     Creatinine 0.8        Creatinine, Random Ur  127.0  Comment:  The random urine reference ranges provided were established   for 24 hour urine collections.  No reference ranges exist for  random urine specimens.  Correlate clinically.         Differential Method Automated        eGFR if  >60        eGFR if non  >60  Comment:  Calculation used to obtain the estimated glomerular filtration  rate (eGFR) is the CKD-EPI equation. Since race is unknown   in our information system, the eGFR values for   -American and Non--American patients are given   for each creatinine result.          Eos # 0.0        Eosinophil% 0.3        Glucose 77        Glucose, UA    Negative     Gran # 7.2        Gran% 67.2        Hematocrit 35.4(L)        Hemoglobin 11.5(L)        Ketones, UA    1+(A)     Leukocytes, UA    2+(A)     Lymph # 2.8        Lymph% 26.0        MCH 26.4(L)        MCHC 32.5        MCV 81(L)        Microscopic Comment    SEE COMMENT  Comment:  Other formed elements not mentioned in the report are not   present in the microscopic examination.        Mono # 0.7        Mono% 6.3        MPV 9.9        Nitrite, UA    Negative     Occult Blood UA    Negative     Opiate Scrn, Ur  Negative       pH, UA    6.0     Platelets 500(H)        Potassium 3.5        Preg Test, Ur   Negative      Total Protein 7.8        Protein, UA    Negative  Comment:  Recommend a 24 hour urine protein or a urine   protein/creatinine ratio if globulin induced proteinuria is  clinically suspected.        Acceptable   Yes      RBC 4.36        RDW 14.6(H)        Sodium 139        Specific Nunn, UA    1.010     Specimen UA    Urine, Clean Catch     Squam Epithel, UA    4     Marijuana (THC) Metabolite  Negative       Toxicology Information  SEE COMMENT  Comment:  This screen includes the following classes of drugs at the   listed cut-off:  Benzodiazepines                   200 ng/ml  Methadone                        300 ng/ml  Cocaine metabolite               300 ng/ml  Opiates                          300 ng/ml  Barbiturates                     200 ng/ml  Amphetamines                    1000 ng/ml  Marijuana metabs (THC)            50 ng/ml  Phencyclidine (PCP)               25 ng/ml  High concentrations of Diphenhydramine may cross-react with  Phencyclidine PCP screening immunoassay giving a false   positive result.  High concentrations of Methylenedioxymethamphetamine (MDMA aka  Ectasy) and other structurally similar compounds may cross-   react with the Amphetamine/Methamphetamine screening   immunoassay giving a false positive result.  A metabolite of the anti-HIV drug Sustiva () may cause  false positive results in the Marijuana metabolite (THC)   screening assay.  Note: This exception list includes only more common   interferants in toxicology screen testing.  Because of many   cross-reactantspositive results on toxicology drug screens   should be confirmed whenever results do not correlate with   clinical presentation.  This report is intended for use in clinical monitoring and  management of patients. It is not intended for use in   employment related drug testing.  Because of any cross-reactants, positive results on toxicology  drug screens should be confirmed whenever results do not  correlate with clinical presentation.  Presumptive positive results are unconfirmed and may be used   only for medical purposes.         TSH 2.954        Urobilinogen, UA    Negative     WBC, UA    2     WBC 10.64              Significant Imaging: I have reviewed all pertinent imaging results/findings within the past 24 hours.

## 2017-10-19 NOTE — PROGRESS NOTES
10/19/17 0900 10/19/17 1000 10/19/17 1100   [facility] Group Therapy   Group Name Community Reintegration Mental Awareness Education   Specific Interventions Current Events Resocialization Relaxation Training   Participation Level --  --  --    Participation Quality Refused;Withdrawn Withdrawn;Refused Withdrawn;Refused   Insight/Motivation --  --  --    Affect/Mood Display Flat;Depressed Flat;Depressed Flat;Depressed   Cognition --  --  --        10/19/17 1300   [facility] Therapy   Group Name Therapeutic Recreation   Specific Interventions Crafts   Participation Level Active;Appropriate;Attentive   Participation Quality Cooperative;Social   Insight/Motivation Good   Affect/Mood Display Appropriate   Cognition Alert;Oriented

## 2017-10-20 LAB
CHOLEST SERPL-MCNC: 206 MG/DL
CHOLEST/HDLC SERPL: 3.7 {RATIO}
ESTIMATED AVG GLUCOSE: 97 MG/DL
FOLATE SERPL-MCNC: 14 NG/ML
HBA1C MFR BLD HPLC: 5 %
HDLC SERPL-MCNC: 56 MG/DL
HDLC SERPL: 27.2 %
LDLC SERPL CALC-MCNC: 141.4 MG/DL
NONHDLC SERPL-MCNC: 150 MG/DL
RPR SER QL: NORMAL
T3 SERPL-MCNC: 105 NG/DL
T4 FREE SERPL-MCNC: 0.83 NG/DL
TRIGL SERPL-MCNC: 43 MG/DL
VIT B12 SERPL-MCNC: 633 PG/ML

## 2017-10-20 PROCEDURE — 25000003 PHARM REV CODE 250: Performed by: PSYCHIATRY & NEUROLOGY

## 2017-10-20 PROCEDURE — 90853 GROUP PSYCHOTHERAPY: CPT | Mod: ,,, | Performed by: PSYCHOLOGIST

## 2017-10-20 PROCEDURE — 84480 ASSAY TRIIODOTHYRONINE (T3): CPT

## 2017-10-20 PROCEDURE — 82746 ASSAY OF FOLIC ACID SERUM: CPT

## 2017-10-20 PROCEDURE — 12400001 HC PSYCH SEMI-PRIVATE ROOM

## 2017-10-20 PROCEDURE — 86592 SYPHILIS TEST NON-TREP QUAL: CPT

## 2017-10-20 PROCEDURE — 99232 SBSQ HOSP IP/OBS MODERATE 35: CPT | Mod: ,,, | Performed by: PSYCHIATRY & NEUROLOGY

## 2017-10-20 PROCEDURE — 83036 HEMOGLOBIN GLYCOSYLATED A1C: CPT

## 2017-10-20 PROCEDURE — 80061 LIPID PANEL: CPT

## 2017-10-20 PROCEDURE — 84439 ASSAY OF FREE THYROXINE: CPT

## 2017-10-20 PROCEDURE — 36415 COLL VENOUS BLD VENIPUNCTURE: CPT

## 2017-10-20 PROCEDURE — 82607 VITAMIN B-12: CPT

## 2017-10-20 RX ORDER — DIAZEPAM 5 MG/1
5 TABLET ORAL 2 TIMES DAILY
Status: COMPLETED | OUTPATIENT
Start: 2017-10-20 | End: 2017-10-21

## 2017-10-20 RX ORDER — MIRTAZAPINE 15 MG/1
15 TABLET, FILM COATED ORAL NIGHTLY
Status: DISCONTINUED | OUTPATIENT
Start: 2017-10-20 | End: 2017-10-24 | Stop reason: HOSPADM

## 2017-10-20 RX ADMIN — FERROUS SULFATE TAB EC 325 MG (65 MG FE EQUIVALENT) 325 MG: 325 (65 FE) TABLET DELAYED RESPONSE at 08:10

## 2017-10-20 RX ADMIN — VENLAFAXINE HYDROCHLORIDE 225 MG: 75 CAPSULE, EXTENDED RELEASE ORAL at 08:10

## 2017-10-20 RX ADMIN — DIAZEPAM 5 MG: 5 TABLET ORAL at 05:10

## 2017-10-20 RX ADMIN — MIRTAZAPINE 15 MG: 15 TABLET, FILM COATED ORAL at 08:10

## 2017-10-20 RX ADMIN — THERA TABS 1 TABLET: TAB at 08:10

## 2017-10-20 RX ADMIN — DIAZEPAM 5 MG: 5 TABLET ORAL at 08:10

## 2017-10-20 RX ADMIN — Medication 1 TABLET: at 08:10

## 2017-10-20 RX ADMIN — LISINOPRIL 5 MG: 2.5 TABLET ORAL at 08:10

## 2017-10-20 NOTE — PROGRESS NOTES
10/20/17 0900 10/20/17 1000 10/20/17 1100   [facility] Group Therapy   Group Name Community Reintegration Education Education   Specific Interventions Current Events Relapse Prevention Relaxation Training   Participation Level Active;Appropriate;Attentive Active;Appropriate Active;Appropriate;Attentive   Participation Quality Cooperative Cooperative;Withdrawn Cooperative;Withdrawn   Insight/Motivation Good Good Good   Affect/Mood Display Appropriate Appropriate Appropriate   Cognition Alert Alert;Oriented Alert;Oriented       10/20/17 1400   [facility] Group Therapy   Group Name Therapeutic Recreation   Specific Interventions (movie social)   Participation Level Active;Attentive;Appropriate   Participation Quality Cooperative;Social   Insight/Motivation Good   Affect/Mood Display Appropriate   Cognition Alert;Oriented

## 2017-10-20 NOTE — PROGRESS NOTES
Group Psychotherapy (PhD/LCSW)    Site: [facility]    Clinical status of patient: Inpatient    Date: 10/20/2017    Group Focus: Life Skills    Length of service: 26202 - 35-40 minutes    Number of patients in attendance: 8    Referred by: Acute Psychiatry Unit Treatment Team    Target symptoms: Substance Abuse, Depression and Adjustment Disorder    Patient's response to treatment: Active Listening and Self-disclosure    Progress toward goals: Progressing slowly    Interval History: Pt appeared alert and attentive in group. Pt participated briefly and appropriately when prompted in a a group discussion of self-fulfilling prophecies.     Diagnosis: Adjustment d/o with mixed disturbance of emotions and conduct     Plan: Continue treatment on APU

## 2017-10-20 NOTE — PLAN OF CARE
Problem: Patient Care Overview (Adult)  Goal: Plan of Care Review  Outcome: Ongoing (interventions implemented as appropriate)  POC discussed with pt, calm and cooperative on the unit. Follows direction and attends group with minimal participation. Med compliant, good hygiene,and good appetite. Denies SI/HI/AVH, flat affect, thoughts are linear. Mood is sad. Out visible on the unit. Safety plan reviewed and environmental rounds done. Reviewed medicine with pt will require further instruction. Pt given time to ask questions, all questions answered. MVC in place will continue to monitor.     Problem: Depression (Adult,Obstetrics,Pediatric)  Goal: Establish/Maintain Self-Care Routine  Patient will demonstrate the desired outcomes by discharge/transition of care.   Outcome: Ongoing (interventions implemented as appropriate)  Pt has good ADL's and does interact well with staff.   Goal: Improved/Stable Mood  Patient will demonstrate the desired outcomes by discharge/transition of care.   Outcome: Ongoing (interventions implemented as appropriate)  Pt is flat and depressed but not suecidal.

## 2017-10-20 NOTE — MEDICAL/APP STUDENT
"[facility] Psychiatry L3 Medical Student Progress Note    Patient: Rosie Izaguirre  MRN: [id number]  Primary Attending: Dr. Dill    Subjective:     Patient reports difficulty sleeping yesterday evening, denies SI/HI/AVH. She endorses good appetite. Per nurse report, patient slept well overnight. Per recreation therapist, patient participated in group activities yesterday evening.     Collateral:  Cynthia Palma, sister ([phone]):   Sees patient almost daily; lives within walking distance. Patient doing worse over past few months, possibly longer. Slowly stopped taking care of responsibilities (housework, being on time). Decreased visits with sister. Sister does not believe patient would attempt to hurt self (accidental or on purpose) or hurt others.      Progressively more miserable over past months.   1. Reactions out of proportion with situation: divorce and sleep until  leaves because she is upset with    2. 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 Negativity: stating "if they can't make it a whole week without me, I should leave," despite the fact that the boss is understanding and gives pt leeway      Possibly triggered by:  1.  working in [facility] last 4-5 months; patient not adjusting well. 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 Possibly jealousy when  is gone.   2. 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 Daughter started college in August and moved away from home.  4.  not dealing well with increased depression. (ongoing for past month, not only since he came home). Per sister,  would like marriage counseling; no divorce papers/etc have been started.      Psych History:  Patient has been on " "antidepressants for last 18 years. Effexor for last 12. Tried other meds before. Patient has mood dips, but usually can compartmentalize and pretend like nothing is going on. Sister unsure if mood dips are happening more frequently. Patient has never "accidentally" or intentionally overdosed, never been hospitalized for psychiatric condition.     Patient used to cope with stress/anxiety/depression with medication and by talking with family (sisters, parents, grandma live nearby). More recently, patient joselin by avoidance - lie in bed and play games on phone.      Family Psych History:  Sister - mixed bipolar (currently on lamictal, seroquel, previously on zoloft).   Grandma's mother - possibly bipolar  Grandma - depression  Mother and mother's four sisters - on antidepressants at some point (mother has been on cymbalta for past few years)       (Sofie Izaguirre [phone], [phone]):   Works offshore, depending on length of projects, generally sees patient for 1 week durations every 2-3 weeks; have been together for 23 years.  denies patient ever attempting suicide or accidentally taking too many pills. Denies that patient would actively or passively attempt suicide.  believes problems started in August, when he failed drug test (adderall). He reports that the patient had a downward trend at that time, as she was upset and hurt, and didn't want to talk with him. 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 However, he feels that this most recent incident (marijuana) resulted in the patient's loss of trust in him, and precipitated her increased depression. He blames self for patient's current condition. He reports motivation to regain trust/seek counseling.  He reports that he attempted to visit patient yesterday while patient was still at [facility], but was turned away by nursing. He is unsure of the reason. " "      Objective:     Vitals (most recent): T 97.7, P 90, R 18, /67  Vitals (last 24 hrs):   Temp:  [97.6 °F (36.4 °C)-98.7 °F (37.1 °C)]   Pulse:  [78-97]   Resp:  [16-18]   BP: (122-138)/(76-92)   No intake/output data recorded.    Mental status exam:  General appearance: poorly groomed, calm  Speech: regular volume and rate  Mood: "better"  Affect: constricted  Thought process: linear, logical   Thought content: denies SI/HI/AVH  Sensorium: alert  Attention/concentration: intact to conversation  Memory: intact to recent and remote events  Insight: fair  Judgement: questionable      Labs:    Trended Lab Data:    Recent Labs  Lab 10/18/17  1350   WBC 10.64   HGB 11.5*   HCT 35.4*   *   MCV 81*   RDW 14.6*      K 3.5      CO2 28   BUN 8   CREATININE 0.8   GLU 77   PROT 7.8   ALBUMIN 3.7   BILITOT 0.3   AST 24   ALKPHOS 104   ALT 20       Currently treated with:  Psychotherapeutics     Start     Stop Route Frequency Ordered    10/19/17 1215  venlafaxine 24 hr capsule 225 mg      -- Oral Daily 10/19/17 1106    10/19/17 1206  lorazepam tablet 2 mg      -- Oral Every 4 hours PRN 10/19/17 1106          Scheduled Meds:   ferrous sulfate  325 mg Oral Daily    folic acid-vit B6-vit B12 2.5-25-2 mg  1 tablet Oral Daily    lisinopril  5 mg Oral Daily    multivitamin  1 tablet Oral Daily    venlafaxine  225 mg Oral Daily     Continuous Infusions:   PRN Meds:.acetaminophen, calcium carbonate, famotidine, hydrOXYzine pamoate, hydrOXYzine pamoate, loperamide, lorazepam, magnesium hydroxide 400 mg/5 ml      Assessment:     Rosie Izaguirre is a 36 y.o. female with:  Patient Active Problem List   Diagnosis    Recurrent major depressive disorder, in partial remission    Obesity (BMI 35.0-39.9 without comorbidity)    Insomnia due to mental disorder    GERD (gastroesophageal reflux disease)    Essential hypertension    Spondylolisthesis of lumbar region    Microcytic anemia    Adjustment disorder " with mixed anxiety and depressed mood    Suicide attempt by substance overdose    Adjustment disorder with mixed disturbance of emotions and conduct    Sedative hypnotic or anxiolytic dependence         Plan:     Adjustment disorder with mixed disturbance of emotions and conduct: -patient admitted 2/2 Xanax overdose  -patient currently denies suicide ideations, endorses poor sleep overnight  -continue PEC and inpatient psychiatric hospitalization for danger to self  -continue Effexor 225 mg PO daily  -start trial of Remeron 15 mg qhs for insomnia    Sedative hypnotic or anxiolytic dependence:  -patient denies withdrawal symptoms  -continue monitoring vital signs q4hrs, tremors, signs of withdrawal  -continue Valium taper. If patient tolerating well, will decrease valium dose Sunday.    Microcytic anemia:  -continue iron supplementation    Hospital Day: 3      Minmin Dereck  [facility] Psychiatry, L3  10/20/2017 9:22 AM    Addendum 1:01 PM  Called sister and  with patient permission. No answer from sister, left message regarding methods to contact or visit patient.  was reached by phone and informed of methods to contact and visit patient. Questions regarding patient condition and estimated length of stay were addressed.  denied additional questions.

## 2017-10-20 NOTE — PLAN OF CARE
Problem: Depression (Adult,Obstetrics,Pediatric)  Goal: Improved/Stable Mood  Patient will demonstrate the desired outcomes by discharge/transition of care.   Outcome: Ongoing (interventions implemented as appropriate)  Affect more animated.  Patient still focused on mariital problems.  Denies suicidal ideations.  Patient attending groups and activities on the unit.  Reports she has social anxiety.  Eating well.  Having problems sleeping.  Will start on Remeron tonight to help her sleep.  Medication compliant.  No complaints of pain or problems on the unit.

## 2017-10-20 NOTE — ASSESSMENT & PLAN NOTE
- Restarted Effexor 225 mg daily  - Trial of Remeron 15 mg qHS for insomnia  - Vistaril PRN insomnia, anxiety  - Will continue to coordinate with family

## 2017-10-20 NOTE — SUBJECTIVE & OBJECTIVE
"Interval History: Patient c/o poor sleep overnight, amenable to detox drom benzos. 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 Conflict triggered by his marijuana use. Denies SI.    PMHx  Past Medical History Reviewed    ROS  History obtained from the patient  General ROS: positive for - sleep disturbance  Gastrointestinal ROS: negative for - appetite loss      EXAMINATION    VITALS   Vitals:    10/20/17 0800   BP: 136/67   Pulse: 90   Resp: 18   Temp: 97.7 °F (36.5 °C)         CONSTITUTIONAL  General Appearance: in scrubs, NAD, calm, cooperative     MUSCULOSKELETAL  Muscle Strength and Tone: normal  Abnormal Involuntary Movements: none noted, no tremor  Gait and Station: normal, fluid    PSYCHIATRIC   Level of Consciousness: alert  Orientation: oriented to person, place, situation  Grooming: fair  Psychomotor Behavior: no agitation, retardation  Speech: normal rate, volume, tone  Language: English, no asphasia  Mood: "fine"  Affect: constricted  Thought Process: linear, logical  Associations: cohesive  Thought Content: denies SI  Memory: intact to recent and remote events  Attention: not distracted  Fund of Knowledge: appropriate for education level  Insight: fair  Judgment: limited      Family History     Problem Relation (Age of Onset)    Anxiety disorder Daughter    Asperger's syndrome Daughter    Hypertension Father        Social History Main Topics    Smoking status: Never Smoker    Smokeless tobacco: Never Used    Alcohol use No    Drug use: No    Sexual activity: Yes     Partners: Male     Psychotherapeutics     Start     Stop Route Frequency Ordered    10/19/17 1215  venlafaxine 24 hr capsule 225 mg      -- Oral Daily 10/19/17 1106    10/19/17 1206  lorazepam tablet 2 mg      -- Oral Every 4 hours PRN 10/19/17 1106           Review of Systems  Objective:     Vital Signs (Most Recent):  Temp: 97.7 °F (36.5 °C) (10/20/17 0800)  Pulse: 90 (10/20/17 0800)  Resp: 18 " (10/20/17 0800)  BP: 136/67 (10/20/17 0800) Vital Signs (24h Range):  Temp:  [97.6 °F (36.4 °C)-98.7 °F (37.1 °C)] 97.7 °F (36.5 °C)  Pulse:  [78-97] 90  Resp:  [16-18] 18  BP: (122-138)/(67-92) 136/67     Height: 5' (152.4 cm)  Weight: 81.6 kg (180 lb)  Body mass index is 35.15 kg/m².    No intake or output data in the 24 hours ending 10/20/17 0929    Physical Exam     Significant Labs:   Last 24 Hours:   Recent Lab Results       10/20/17  0525      Cholesterol 206  Comment:  The National Cholesterol Education Program (NCEP) has set the  following guidelines (reference ranges) for Cholesterol:  Optimal.....................<200 mg/dL  Borderline High.............200-239 mg/dL  High........................> or = 240 mg/dL  (H)     Estimated Avg Glucose 97     Folate 14.0     Free T4 0.83     HDL 56  Comment:  The National Cholesterol Education Program (NCEP) has set the  following guidelines (reference values) for HDL Cholesterol:  Low...............<40 mg/dL  Optimal...........>60 mg/dL       HDL/Chol Ratio 27.2     Hemoglobin A1C 5.0  Comment:  According to ADA guidelines, hemoglobin A1c <7.0% represents  optimal control in non-pregnant diabetic patients. Different  metrics may apply to specific patient populations.   Standards of Medical Care in Diabetes-2016.  For the purpose of screening for the presence of diabetes:  <5.7%     Consistent with the absence of diabetes  5.7-6.4%  Consistent with increasing risk for diabetes   (prediabetes)  >or=6.5%  Consistent with diabetes  Currently, no consensus exists for use of hemoglobin A1c  for diagnosis of diabetes for children.  This Hemoglobin A1c assay has significant interference with fetal   hemoglobin   (HbF). The results are invalid for patients with abnormal amounts of   HbF,   including those with known Hereditary Persistence   of Fetal Hemoglobin. Heterozygous hemoglobin variants (HbAS, HbAC,   HbAD, HbAE, HbA2) do not significantly interfere with this assay;    however, presence of multiple variants in a sample may impact the %   interference.       LDL Cholesterol 141.4  Comment:  The National Cholesterol Education Program (NCEP) has set the  following guidelines (reference values) for LDL Cholesterol:  Optimal.......................<130 mg/dL  Borderline High...............130-159 mg/dL  High..........................160-189 mg/dL  Very High.....................>190 mg/dL       Non-HDL Cholesterol 150  Comment:  Risk category and Non-HDL cholesterol goals:  Coronary heart disease (CHD)or equivalent (10-year risk of CHD >20%):  Non-HDL cholesterol goal     <130 mg/dL  Two or more CHD risk factors and 10-year risk of CHD <= 20%:  Non-HDL cholesterol goal     <160 mg/dL  0 to 1 CHD risk factor:  Non-HDL cholesterol goal     <190 mg/dL       T3, Total 105     Total Cholesterol/HDL Ratio 3.7     Triglycerides 43  Comment:  The National Cholesterol Education Program (NCEP) has set the  following guidelines (reference values) for triglycerides:  Normal......................<150 mg/dL  Borderline High.............150-199 mg/dL  High........................200-499 mg/dL       Vitamin B-12 633           Significant Imaging: None

## 2017-10-20 NOTE — PROGRESS NOTES
10/19/17 2000   [facility] Group Therapy   Group Name Community Reintegration   Specific Interventions Other (see comments)  (wrap up)   Participation Level Appropriate;Minimal   Participation Quality Requires Prompting   Insight/Motivation Good   Affect/Mood Display Appropriate   Cognition Alert;Oriented

## 2017-10-20 NOTE — PROGRESS NOTES
"[facility]  Psychiatry  Progress Note    Patient Name: Rosie Izaguirre  MRN: [id number]   Code Status: Full Code  Admission Date: 10/18/2017  Hospital Length of Stay: 2 days  Expected Discharge Date:   Attending Physician: Rodrigue Dill Jr., MD  Primary Care Provider: Hang Mai MD    Current Legal Status: [facility]    Patient information was obtained from patient, relative(s) and ER records.     Subjective:     Principal Problem:Adjustment disorder with mixed disturbance of emotions and conduct    Chief Complaint: overdose    HPI: HPI:  Per initial consult note:  Patient Rosie Izaguirre was brought to the hospital by her sisters who were concerned for her behaviors for the past couple of days.  Patient states that she got into a fight with her  on Sunday which ended in a decision to split up.  He is leaving this Friday to go back to [place].  She decided to "sleep" until he left.  She took 10-15 Xanax 0.5mg tablets on Monday, yesterday, and again today.  She has been in bed for 3 days and has not gone to work.  Patient is interviewed in her room and is noted to be slurred and somewhat slowed in her responses.  She admits to about a 15 year history of depression treated by an outpatient psychiatrist (Oscar Mckeon MD) for the duration.  She admits to being "fine" until this weekend with an occasional dip in her mood "about every couple of months".  She has been sad, depressed, and lacking motivation since Sunday.  The fight with her  was over the fact that he was purchasing marijuana and she in not in agreement with drug use.  She denies any anxiety, manic, or psychotic history.  Biggest complaint is sleep.  Has been on zolpidem for years.  Also takes Effexor XR 225mg daily for about 9 years.  In the past has failed Paxil and Lexapro.  He has also prescribed her Xanax but no prescriptions written in past year or so.  He also treats her daughter (Aspergers and anxiety) and does write " "for her Xanax also.  This information was obtained in a conversation with Dr. Mckeon on the phone and he asks that primary treatment team contact him prior to discharge to coordinate outpatient follow up.  Patient and her psychiatrist deny any previous suicide attempts or self harm.  Says that the only gun in her home is an "airsoft" gun.  Note that her blood pressure is elevated.    Inpatient psychiatric interview:  She reports her sisters brought her in to the hospital today because they knew something was wrong. She states on Sunday she and her  started fighting because she found out he had been buying weed. She states this escalated in verbal arguments until they decided to get . She states for the past 3 days she has been trying to sleep through the pain because she didn't want to deal with it. So she took 10-15 xanax 0.5 mg tablets per day in order to sleep constantly; last took xanax yesterday afternoon. She denies having any intention of trying to harm or kill herself. She states prior to this acute episode she had been doing well but can recall a recent MDE lasting about 2 weeks in May of this year but was able to "bounce back." She reports she has been complaint with home dose of Effexor. Denies any prior hospitalizations or prior suicide attempts. Denies history of substance abuse or rehab. Denies having any other medical problems. She is wearing a splint on her right pinky finger due punching the wall on Sunday and was diagnosed with a broken finger.    Collateral Information:  Cynthia Palma, sister ([phone]), obtained by Britt Harden: :   Sees patient almost daily; lives within walking distance. Patient doing worse over past few months, possibly longer. Slowly stopped taking care of responsibilities (housework, being on time). Decreased visits with sister. Sister does not believe patient would attempt to hurt self (accidental or on purpose) or hurt others.      Progressively more " "miserable over past months.   1. Reactions out of proportion with situation: divorce and sleep until  leaves because she is upset with    2. 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 Negativity: stating "if they can't make it a whole week without me, I should leave," despite the fact that the boss is understanding and gives pt leeway      Possibly triggered by:  1.  working in [facility] last 4-5 months; patient not adjusting well. 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 Possibly jealousy when  is gone.   2. 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 Daughter started college in August and moved away from home.  4.  not dealing well with increased depression. (ongoing for past month, not only since he came home). Per sister,  would like marriage counseling; no divorce papers/etc have been started.      Psych History:  Patient has been on antidepressants for last 18 years. Effexor for last 12. Tried other meds before. Patient has mood dips, but usually can compartmentalize and pretend like nothing is going on. Sister unsure if mood dips are happening more frequently. Patient has never "accidentally" or intentionally overdosed, never been hospitalized for psychiatric condition.     Patient used to cope with stress/anxiety/depression with medication and by talking with family (sisters, parents, grandma live nearby). More recently, patient joselin by avoidance - lie in bed and play games on phone.      Family Psych History:  Sister - mixed bipolar (currently on lamictal, seroquel, previously on zoloft).   Grandma's mother - possibly bipolar  Grandma - depression  Mother and mother's four sisters - on antidepressants at some " "point (mother has been on cymbalta for past few years)      Sister's Concerns:   1. Get patient more "in touch with reality"     2. Possible non-medication therapy for patient as adjunct to meds?     3. Is current med (effexor) not working anymore; change med?     4. Is patient possibly mixed bipolar also?     5. 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       (Sofie Izaguirre [phone], [phone]), obtained by Ms3 Toño Harden:   Works offshore, depending on length of projects, generally sees patient for 1 week durations every 2-3 weeks; have been together for 23 years.  denies patient ever attempting suicide or accidentally taking too many pills. Denies that patient would actively or passively attempt suicide.  believes problems started in August, when he failed drug test (adderall). He reports that the patient had a downward trend at that time, as she was upset and hurt, and didn't want to talk with him. 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 However, he feels that this most recent incident (marijuana) resulted in the patient's loss of trust in him, and precipitated her increased depression. He blames self for patient's current condition. He reports motivation to regain trust/seek counseling.  He reports that he attempted to visit patient yesterday while patient was still at [facility], but was turned away by nursing. He is unsure of the reason.      's concerns:  1. When will pt be discharged home? He would like for her condition to improve, but is trying to figure out if he can go back offshore Saturday.  2. Can he speak with patient? (re: he was turned away at [facility])     PPH:  -Outpatient psychiatrist: Oscar Pompa, follows up q 3 months  -no prior suicide attempts  -diagnosed with depression at 21 years of " "age  -hospitalizations: none  -medication trials: celexa, lexapro, has been on Effexor 225 mg PO qd for the last 15 years. 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 Pt with no prior psychiatric hospitalizations was admitted following an overdose of her daughter's Xanax.  She denies she was suicidal, stating instead that, after an argument with her , she wanted to "sleep for 3 days" until he returned to work offshore. On interview, Pt was alert, with passive, lethargic demeanor and restricted affect.  She denied suicidal or violent thoughts, or any s/s of psychosis.  She endorsed current withdrawal from Effexor 225 mg per day which she has not had for up to 3 days. Pt agreed that team could speak with her family, including her .  Effexor will be restarted. Additional lab will be ordered. Valium detox.   10/20/17: Appears brighter. Tolerating Valium taper, no signs Wd. C/o insomnia, will start Remeron 15 mg qHS.     Interval History: Patient c/o poor sleep overnight, amenable to detox drom benzos. 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 Conflict triggered by his marijuana use. Denies SI.    PMHx  Past Medical History Reviewed    ROS  History obtained from the patient  General ROS: positive for - sleep disturbance  Gastrointestinal ROS: negative for - appetite loss      EXAMINATION    VITALS   Vitals:    10/20/17 0800   BP: 136/67   Pulse: 90   Resp: 18   Temp: 97.7 °F (36.5 °C)         CONSTITUTIONAL  General Appearance: in scrubs, NAD, calm, cooperative     MUSCULOSKELETAL  Muscle Strength and Tone: normal  Abnormal Involuntary " "Movements: none noted, no tremor  Gait and Station: normal, fluid    PSYCHIATRIC   Level of Consciousness: alert  Orientation: oriented to person, place, situation  Grooming: fair  Psychomotor Behavior: no agitation, retardation  Speech: normal rate, volume, tone  Language: English, no asphasia  Mood: "fine"  Affect: constricted  Thought Process: linear, logical  Associations: cohesive  Thought Content: denies SI  Memory: intact to recent and remote events  Attention: not distracted  Fund of Knowledge: appropriate for education level  Insight: fair  Judgment: limited      Family History     Problem Relation (Age of Onset)    Anxiety disorder Daughter    Asperger's syndrome Daughter    Hypertension Father        Social History Main Topics    Smoking status: Never Smoker    Smokeless tobacco: Never Used    Alcohol use No    Drug use: No    Sexual activity: Yes     Partners: Male     Psychotherapeutics     Start     Stop Route Frequency Ordered    10/19/17 1215  venlafaxine 24 hr capsule 225 mg      -- Oral Daily 10/19/17 1106    10/19/17 1206  lorazepam tablet 2 mg      -- Oral Every 4 hours PRN 10/19/17 1106           Review of Systems  Objective:     Vital Signs (Most Recent):  Temp: 97.7 °F (36.5 °C) (10/20/17 0800)  Pulse: 90 (10/20/17 0800)  Resp: 18 (10/20/17 0800)  BP: 136/67 (10/20/17 0800) Vital Signs (24h Range):  Temp:  [97.6 °F (36.4 °C)-98.7 °F (37.1 °C)] 97.7 °F (36.5 °C)  Pulse:  [78-97] 90  Resp:  [16-18] 18  BP: (122-138)/(67-92) 136/67     Height: 5' (152.4 cm)  Weight: 81.6 kg (180 lb)  Body mass index is 35.15 kg/m².    No intake or output data in the 24 hours ending 10/20/17 0929    Physical Exam     Significant Labs:   Last 24 Hours:   Recent Lab Results       10/20/17  0525      Cholesterol 206  Comment:  The National Cholesterol Education Program (NCEP) has set the  following guidelines (reference ranges) for Cholesterol:  Optimal.....................<200 mg/dL  Borderline " High.............200-239 mg/dL  High........................> or = 240 mg/dL  (H)     Estimated Avg Glucose 97     Folate 14.0     Free T4 0.83     HDL 56  Comment:  The National Cholesterol Education Program (NCEP) has set the  following guidelines (reference values) for HDL Cholesterol:  Low...............<40 mg/dL  Optimal...........>60 mg/dL       HDL/Chol Ratio 27.2     Hemoglobin A1C 5.0  Comment:  According to ADA guidelines, hemoglobin A1c <7.0% represents  optimal control in non-pregnant diabetic patients. Different  metrics may apply to specific patient populations.   Standards of Medical Care in Diabetes-2016.  For the purpose of screening for the presence of diabetes:  <5.7%     Consistent with the absence of diabetes  5.7-6.4%  Consistent with increasing risk for diabetes   (prediabetes)  >or=6.5%  Consistent with diabetes  Currently, no consensus exists for use of hemoglobin A1c  for diagnosis of diabetes for children.  This Hemoglobin A1c assay has significant interference with fetal   hemoglobin   (HbF). The results are invalid for patients with abnormal amounts of   HbF,   including those with known Hereditary Persistence   of Fetal Hemoglobin. Heterozygous hemoglobin variants (HbAS, HbAC,   HbAD, HbAE, HbA2) do not significantly interfere with this assay;   however, presence of multiple variants in a sample may impact the %   interference.       LDL Cholesterol 141.4  Comment:  The National Cholesterol Education Program (NCEP) has set the  following guidelines (reference values) for LDL Cholesterol:  Optimal.......................<130 mg/dL  Borderline High...............130-159 mg/dL  High..........................160-189 mg/dL  Very High.....................>190 mg/dL       Non-HDL Cholesterol 150  Comment:  Risk category and Non-HDL cholesterol goals:  Coronary heart disease (CHD)or equivalent (10-year risk of CHD >20%):  Non-HDL cholesterol goal     <130 mg/dL  Two or more CHD risk factors and  10-year risk of CHD <= 20%:  Non-HDL cholesterol goal     <160 mg/dL  0 to 1 CHD risk factor:  Non-HDL cholesterol goal     <190 mg/dL       T3, Total 105     Total Cholesterol/HDL Ratio 3.7     Triglycerides 43  Comment:  The National Cholesterol Education Program (NCEP) has set the  following guidelines (reference values) for triglycerides:  Normal......................<150 mg/dL  Borderline High.............150-199 mg/dL  High........................200-499 mg/dL       Vitamin B-12 633           Significant Imaging: None    Assessment/Plan:     * Adjustment disorder with mixed disturbance of emotions and conduct    - Restarted Effexor 225 mg daily  - Trial of Remeron 15 mg qHS for insomnia  - Vistaril PRN insomnia, anxiety  - Will continue to coordinate with family        Sedative hypnotic or anxiolytic dependence    - Began short Valium taper 10/19, last dose 5 mg 0900 10/21  - VS q4 hours to monitor for WD  - Recommended patient abstain from benzodiazepine use        Suicide attempt by substance overdose    -continue PEC and inpatient psychiatric hospitalization for danger to self  -resume home dose of Effexor in Am, consider addition of mood stabilizer  - patient on dangers of misuse of medications        Microcytic anemia    - Continue iron supplementation             Need for Continued Hospitalization:   Requires ongoing hospitalization for stabilization of medications.    Anticipated Disposition: Home or Self Care    Karen Barnes MD   Psychiatry  [facility]

## 2017-10-20 NOTE — HOSPITAL COURSE
"10/19/17: Seen by treatment team. Pt with no prior psychiatric hospitalizations was admitted following an overdose of her daughter's Xanax.  She denies she was suicidal, stating instead that, after an argument with her , she wanted to "sleep for 3 days" until he returned to work [place]. On interview, Pt was alert, with passive, lethargic demeanor and restricted affect.  She denied suicidal or violent thoughts, or any s/s of psychosis.  She endorsed current withdrawal from Effexor 225 mg per day which she has not had for up to 3 days. Pt agreed that team could speak with her family, including her .  Effexor will be restarted. Additional lab will be ordered. Valium detox.   10/20/17: Appears brighter. Tolerating Valium taper, no signs Wd. C/o insomnia, will start Remeron 15 mg qHS.   10/21/17 - slept well, more interactive in milieu, no s/sx complicated withdrawal noted.  10/22/17 - improving on unit, brighter mood, interactive in milieu, difficulty with sleep last night, valium taper completed  10/23/17 - continuing to improve; mood and affect improving; denies SI/HI/AH/VH; family meeting tomorrow; ordering CBC and CMP for tomorrow  10/24/17 - Had family meeting with two sisters Alethea and Cherelle. Discussed hospital course, medications and possible side effects. All concerns addressed. Will f/u with psychiatrist Dr Dailey and PCP Dr Mai.   "
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
## 2017-10-20 NOTE — PLAN OF CARE
Problem: Patient Care Overview (Adult)  Goal: Plan of Care Review  Outcome: Ongoing (interventions implemented as appropriate)  Patient is visible on the unit.  Reports mood is depressed but continues to deny suicidal ideations.  Minimizes xanax use but does agree it is not a good thing for her to do.  Reports that she want to work out her marital problems.  Affect brighter.  Less hopeless.  Still having problems sleeping.  Eating well.  Attending groups.

## 2017-10-21 PROCEDURE — 12400001 HC PSYCH SEMI-PRIVATE ROOM

## 2017-10-21 PROCEDURE — 99232 SBSQ HOSP IP/OBS MODERATE 35: CPT | Mod: ,,, | Performed by: PSYCHIATRY & NEUROLOGY

## 2017-10-21 PROCEDURE — 25000003 PHARM REV CODE 250: Performed by: PSYCHIATRY & NEUROLOGY

## 2017-10-21 PROCEDURE — 25000003 PHARM REV CODE 250: Performed by: STUDENT IN AN ORGANIZED HEALTH CARE EDUCATION/TRAINING PROGRAM

## 2017-10-21 RX ADMIN — MIRTAZAPINE 15 MG: 15 TABLET, FILM COATED ORAL at 09:10

## 2017-10-21 RX ADMIN — FERROUS SULFATE TAB EC 325 MG (65 MG FE EQUIVALENT) 325 MG: 325 (65 FE) TABLET DELAYED RESPONSE at 08:10

## 2017-10-21 RX ADMIN — VENLAFAXINE HYDROCHLORIDE 225 MG: 75 CAPSULE, EXTENDED RELEASE ORAL at 08:10

## 2017-10-21 RX ADMIN — THERA TABS 1 TABLET: TAB at 08:10

## 2017-10-21 RX ADMIN — DIAZEPAM 5 MG: 5 TABLET ORAL at 08:10

## 2017-10-21 RX ADMIN — ACETAMINOPHEN 650 MG: 325 TABLET ORAL at 05:10

## 2017-10-21 RX ADMIN — Medication 1 TABLET: at 08:10

## 2017-10-21 RX ADMIN — LISINOPRIL 5 MG: 2.5 TABLET ORAL at 08:10

## 2017-10-21 NOTE — SUBJECTIVE & OBJECTIVE
Interval History: patient adjusting to milieu of unit, more visible, brighter affect per staff.  She slept better last night with initiation of remeron.  She denies SI.  She states mood is improving.  She was able to talk about overdose that led to admission, discussing improved coping strategies for the future.  She was noted to be future oriented.  No issues with valium taper - now complete after AM dose today.    Verbal sign out provided by Dr. Dill to me yesterday.    PMHx  Past Medical History Reviewed    ROS  General: no diaphoresis  GI: no N/V  Neuro: no tremor      EXAMINATION    VITALS   Vitals:    10/21/17 0828   BP: 123/78   Pulse: 98   Resp:    Temp: 98 °F (36.7 °C)       CONSTITUTIONAL  General Appearance: casually dressed    MUSCULOSKELETAL  Muscle Strength and Tone: no weakness or spasticity  Abnormal Involuntary Movements: no tremor noted  Gait and Station: ambulates without assistance    PSYCHIATRIC   Level of Consciousness: alert  Orientation: to person, place, time  Grooming: mildly disheveled  Psychomotor Behavior: no retardation or agitation  Speech: conversational, somewhat decreased spontaneity  Language: fluent english, repeats words/phrases  Mood: better  Affect: mildly constricted but per staff overall brighter  Thought Process: linear  Associations: intact, no loosening of associations  Thought Content: denies SI  Memory: grossly intact  Attention: not distractible  Fund of Knowledge: intact  Insight: improving  Judgment: improving      Family History     Problem Relation (Age of Onset)    Anxiety disorder Daughter    Asperger's syndrome Daughter    Hypertension Father        Social History Main Topics    Smoking status: Never Smoker    Smokeless tobacco: Never Used    Alcohol use No    Drug use: No    Sexual activity: Yes     Partners: Male     Psychotherapeutics     Start     Stop Route Frequency Ordered    10/20/17 2100  mirtazapine tablet 15 mg      -- Oral Nightly 10/20/17  0940    10/19/17 1215  venlafaxine 24 hr capsule 225 mg      -- Oral Daily 10/19/17 1106    10/19/17 1206  lorazepam tablet 2 mg      -- Oral Every 4 hours PRN 10/19/17 1106           Review of Systems  Objective:     Vital Signs (Most Recent):  Temp: 98 °F (36.7 °C) (10/21/17 0828)  Pulse: 98 (10/21/17 0828)  Resp: 18 (10/21/17 0000)  BP: 123/78 (10/21/17 0828) Vital Signs (24h Range):  Temp:  [98 °F (36.7 °C)-98.2 °F (36.8 °C)] 98 °F (36.7 °C)  Pulse:  [] 98  Resp:  [18-20] 18  BP: (123-135)/(72-84) 123/78     Height: 5' (152.4 cm)  Weight: 81.6 kg (180 lb)  Body mass index is 35.15 kg/m².    No intake or output data in the 24 hours ending 10/21/17 1037    Physical Exam     Significant Labs:   Last 24 Hours:   Recent Lab Results     None          Significant Imaging: None

## 2017-10-21 NOTE — PROGRESS NOTES
"[facility]  Psychiatry  Progress Note    Patient Name: Rosie Izaguirre  MRN: [id number]   Code Status: Full Code  Admission Date: 10/18/2017  Hospital Length of Stay: 3 days  Expected Discharge Date:   Attending Physician: Rodrigue Dill Jr., MD  Primary Care Provider: Hang Mai MD    Current Legal Status: [facility]    Patient information was obtained from patient.     Subjective:     Principal Problem:Adjustment disorder with mixed disturbance of emotions and conduct    Chief Complaint: depression, anxiety, s/p overdose    HPI: HPI:  Per initial consult note:  Patient Rosie Izaguirre was brought to the hospital by her sisters who were concerned for her behaviors for the past couple of days.  Patient states that she got into a fight with her  on Sunday which ended in a decision to split up.  He is leaving this Friday to go back to [place].  She decided to "sleep" until he left.  She took 10-15 Xanax 0.5mg tablets on Monday, yesterday, and again today.  She has been in bed for 3 days and has not gone to work.  Patient is interviewed in her room and is noted to be slurred and somewhat slowed in her responses.  She admits to about a 15 year history of depression treated by an outpatient psychiatrist (Oscar Mckeon MD) for the duration.  She admits to being "fine" until this weekend with an occasional dip in her mood "about every couple of months".  She has been sad, depressed, and lacking motivation since Sunday.  The fight with her  was over the fact that he was purchasing marijuana and she in not in agreement with drug use.  She denies any anxiety, manic, or psychotic history.  Biggest complaint is sleep.  Has been on zolpidem for years.  Also takes Effexor XR 225mg daily for about 9 years.  In the past has failed Paxil and Lexapro.  He has also prescribed her Xanax but no prescriptions written in past year or so.  He also treats her daughter (Aspergers and anxiety) and does write " "for her Xanax also.  This information was obtained in a conversation with Dr. Mckeon on the phone and he asks that primary treatment team contact him prior to discharge to coordinate outpatient follow up.  Patient and her psychiatrist deny any previous suicide attempts or self harm.  Says that the only gun in her home is an "airsoft" gun.  Note that her blood pressure is elevated.    Inpatient psychiatric interview:  She reports her sisters brought her in to the hospital today because they knew something was wrong. She states on Sunday she and her  started fighting because she found out he had been buying weed. She states this escalated in verbal arguments until they decided to get . She states for the past 3 days she has been trying to sleep through the pain because she didn't want to deal with it. So she took 10-15 xanax 0.5 mg tablets per day in order to sleep constantly; last took xanax yesterday afternoon. She denies having any intention of trying to harm or kill herself. She states prior to this acute episode she had been doing well but can recall a recent MDE lasting about 2 weeks in May of this year but was able to "bounce back." She reports she has been complaint with home dose of Effexor. Denies any prior hospitalizations or prior suicide attempts. Denies history of substance abuse or rehab. Denies having any other medical problems. She is wearing a splint on her right pinky finger due punching the wall on Sunday and was diagnosed with a broken finger.    Collateral Information:  Cynthia Palma, sister ([phone]), obtained by Britt Harden: :   Sees patient almost daily; lives within walking distance. Patient doing worse over past few months, possibly longer. Slowly stopped taking care of responsibilities (housework, being on time). Decreased visits with sister. Sister does not believe patient would attempt to hurt self (accidental or on purpose) or hurt others.      Progressively more " "miserable over past months.   1. Reactions out of proportion with situation: divorce and sleep until  leaves because she is upset with    2. 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 Negativity: stating "if they can't make it a whole week without me, I should leave," despite the fact that the boss is understanding and gives pt leeway      Possibly triggered by:  1.  working in [facility] last 4-5 months; patient not adjusting well. 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 Possibly jealousy when  is gone.   2. 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 Daughter started college in August and moved away from home.  4.  not dealing well with increased depression. (ongoing for past month, not only since he came home). Per sister,  would like marriage counseling; no divorce papers/etc have been started.      Psych History:  Patient has been on antidepressants for last 18 years. Effexor for last 12. Tried other meds before. Patient has mood dips, but usually can compartmentalize and pretend like nothing is going on. Sister unsure if mood dips are happening more frequently. Patient has never "accidentally" or intentionally overdosed, never been hospitalized for psychiatric condition.     Patient used to cope with stress/anxiety/depression with medication and by talking with family (sisters, parents, grandma live nearby). More recently, patient joselin by avoidance - lie in bed and play games on phone.      Family Psych History:  Sister - mixed bipolar (currently on lamictal, seroquel, previously on zoloft).   Grandma's mother - possibly bipolar  Grandma - depression  Mother and mother's four sisters - on antidepressants at some " "point (mother has been on cymbalta for past few years)      Sister's Concerns:   1. Get patient more "in touch with reality"     2. Possible non-medication therapy for patient as adjunct to meds?     3. Is current med (effexor) not working anymore; change med?     4. Is patient possibly mixed bipolar also?     5. 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       (Sofie Izaguirre [phone], [phone]), obtained by Ms3 Toño Harden:   Works offshore, depending on length of projects, generally sees patient for 1 week durations every 2-3 weeks; have been together for 23 years.  denies patient ever attempting suicide or accidentally taking too many pills. Denies that patient would actively or passively attempt suicide.  believes problems started in August, when he failed drug test (adderall). He reports that the patient had a downward trend at that time, as she was upset and hurt, and didn't want to talk with him. 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 However, he feels that this most recent incident (marijuana) resulted in the patient's loss of trust in him, and precipitated her increased depression. He blames self for patient's current condition. He reports motivation to regain trust/seek counseling.  He reports that he attempted to visit patient yesterday while patient was still at [facility], but was turned away by nursing. He is unsure of the reason.      's concerns:  1. When will pt be discharged home? He would like for her condition to improve, but is trying to figure out if he can go back [place] Saturday.  2. Can he speak with patient? (re: he was turned away at [facility])     PPH:  -Outpatient psychiatrist: Oscar Pompa, follows up q 3 months  -no prior suicide attempts  -diagnosed with depression at 21 years of " "age  -hospitalizations: none  -medication trials: celexa, lexapro, has been on Effexor 225 mg PO qd for the last 15 years. 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 Pt with no prior psychiatric hospitalizations was admitted following an overdose of her daughter's Xanax.  She denies she was suicidal, stating instead that, after an argument with her , she wanted to "sleep for 3 days" until he returned to work offshore. On interview, Pt was alert, with passive, lethargic demeanor and restricted affect.  She denied suicidal or violent thoughts, or any s/s of psychosis.  She endorsed current withdrawal from Effexor 225 mg per day which she has not had for up to 3 days. Pt agreed that team could speak with her family, including her .  Effexor will be restarted. Additional lab will be ordered. Valium detox.   10/20/17: Appears brighter. Tolerating Valium taper, no signs Wd. C/o insomnia, will start Remeron 15 mg qHS.   10/21/17 - slept well, more interactive in milieu, no s/sx complicated withdrawal noted.    Interval History: patient adjusting to milieu of unit, more visible, brighter affect per staff.  She slept better last night with initiation of remeron.  She denies SI.  She states mood is improving.  She was able to talk about overdose that led to admission, discussing improved coping strategies for the future.  She was noted to be future oriented.  No issues with valium taper - now complete after AM dose today.    Verbal sign out provided by Dr. Dill to me yesterday.    PMHx  Past Medical History Reviewed    ROS  General: no diaphoresis  GI: no N/V  Neuro: no " tremor      EXAMINATION    VITALS   Vitals:    10/21/17 0828   BP: 123/78   Pulse: 98   Resp:    Temp: 98 °F (36.7 °C)       CONSTITUTIONAL  General Appearance: casually dressed    MUSCULOSKELETAL  Muscle Strength and Tone: no weakness or spasticity  Abnormal Involuntary Movements: no tremor noted  Gait and Station: ambulates without assistance    PSYCHIATRIC   Level of Consciousness: alert  Orientation: to person, place, time  Grooming: mildly disheveled  Psychomotor Behavior: no retardation or agitation  Speech: conversational, somewhat decreased spontaneity  Language: fluent english, repeats words/phrases  Mood: better  Affect: mildly constricted but per staff overall brighter  Thought Process: linear  Associations: intact, no loosening of associations  Thought Content: denies SI  Memory: grossly intact  Attention: not distractible  Fund of Knowledge: intact  Insight: improving  Judgment: improving      Family History     Problem Relation (Age of Onset)    Anxiety disorder Daughter    Asperger's syndrome Daughter    Hypertension Father        Social History Main Topics    Smoking status: Never Smoker    Smokeless tobacco: Never Used    Alcohol use No    Drug use: No    Sexual activity: Yes     Partners: Male     Psychotherapeutics     Start     Stop Route Frequency Ordered    10/20/17 2100  mirtazapine tablet 15 mg      -- Oral Nightly 10/20/17 0940    10/19/17 1215  venlafaxine 24 hr capsule 225 mg      -- Oral Daily 10/19/17 1106    10/19/17 1206  lorazepam tablet 2 mg      -- Oral Every 4 hours PRN 10/19/17 1106           Review of Systems  Objective:     Vital Signs (Most Recent):  Temp: 98 °F (36.7 °C) (10/21/17 0828)  Pulse: 98 (10/21/17 0828)  Resp: 18 (10/21/17 0000)  BP: 123/78 (10/21/17 0828) Vital Signs (24h Range):  Temp:  [98 °F (36.7 °C)-98.2 °F (36.8 °C)] 98 °F (36.7 °C)  Pulse:  [] 98  Resp:  [18-20] 18  BP: (123-135)/(72-84) 123/78     Height: 5' (152.4 cm)  Weight: 81.6 kg (180  lb)  Body mass index is 35.15 kg/m².    No intake or output data in the 24 hours ending 10/21/17 1037    Physical Exam     Significant Labs:   Last 24 Hours:   Recent Lab Results     None          Significant Imaging: None    Assessment/Plan:     * Adjustment disorder with mixed disturbance of emotions and conduct    - Restarted Effexor 225 mg daily  - Trial of Remeron 15 mg qHS for insomnia - initiated 10/20  - Vistaril PRN insomnia, anxiety  - Will continue to coordinate with family    Current diagnosis Adjustment Disorder per primary team, but she may have MDD and/or anxiety spectrum disorder        Sedative hypnotic or anxiolytic dependence    - Began short Valium taper 10/19, last dose 5 mg 0900 10/21  - VS q4 hours to monitor for WD  - Recommended patient abstain from benzodiazepine use    No s/sx complicated withdrawal - cont to monitor.        Suicide attempt by substance overdose    -continue PEC and inpatient psychiatric hospitalization for danger to self  -resume home dose of Effexor in Am, consider addition of mood stabilizer  - patient on dangers of misuse of medications        Microcytic anemia    - Continue iron supplementation        Insomnia due to mental disorder    remeron should help with sleep - initiated 10/20/17             Need for Continued Hospitalization:   Protective inpatient psychiatric hospitalization required while a safe disposition plan is enacted. and Requires ongoing hospitalization for stabilization of medications.    Anticipated Disposition: Home or Self Care    Yadiel Ennis MD   Psychiatry  [facility]

## 2017-10-21 NOTE — ASSESSMENT & PLAN NOTE
- Restarted Effexor 225 mg daily  - Trial of Remeron 15 mg qHS for insomnia - initiated 10/20  - Vistaril PRN insomnia, anxiety  - Will continue to coordinate with family    Current diagnosis Adjustment Disorder per primary team, but she may have MDD and/or anxiety spectrum disorder

## 2017-10-21 NOTE — NURSING
Pt. slept 7 hours throughout the night as noted during frequent rounds. Respirations even and unlabored. No distress. Safety mainatined. Continue to monitor.

## 2017-10-21 NOTE — ASSESSMENT & PLAN NOTE
- Began short Valium taper 10/19, last dose 5 mg 0900 10/21  - VS q4 hours to monitor for WD  - Recommended patient abstain from benzodiazepine use    No s/sx complicated withdrawal - cont to monitor.

## 2017-10-21 NOTE — PLAN OF CARE
Problem: Patient Care Overview (Adult)  Goal: Plan of Care Review  Outcome: Ongoing (interventions implemented as appropriate)  Patient calm and cooperative Denies SI/HI/AVH. Mood good. Endorses had good night sleep. Interactive and attended all unit activities. Vital signs stable. Denies any withdrawal or physical discomfort..

## 2017-10-21 NOTE — PLAN OF CARE
Problem: Patient Care Overview (Adult)  Goal: Plan of Care Review  Outcome: Ongoing (interventions implemented as appropriate)  Observed awake and alert. Affect flat, mood depressed. Pt. denied SI/HI, A/V hallucinations. Took scheduled medications, attend group activities. Appeared asleep throughout the night as noted during frequent rounds. Respirations even and unlabored. No distress noted. Safety maintained. Continue to monitor.

## 2017-10-22 PROCEDURE — 25000003 PHARM REV CODE 250: Performed by: PSYCHIATRY & NEUROLOGY

## 2017-10-22 PROCEDURE — 99232 SBSQ HOSP IP/OBS MODERATE 35: CPT | Mod: ,,, | Performed by: PSYCHIATRY & NEUROLOGY

## 2017-10-22 PROCEDURE — 25000003 PHARM REV CODE 250: Performed by: STUDENT IN AN ORGANIZED HEALTH CARE EDUCATION/TRAINING PROGRAM

## 2017-10-22 PROCEDURE — 12400001 HC PSYCH SEMI-PRIVATE ROOM

## 2017-10-22 RX ADMIN — MIRTAZAPINE 15 MG: 15 TABLET, FILM COATED ORAL at 08:10

## 2017-10-22 RX ADMIN — FERROUS SULFATE TAB EC 325 MG (65 MG FE EQUIVALENT) 325 MG: 325 (65 FE) TABLET DELAYED RESPONSE at 08:10

## 2017-10-22 RX ADMIN — ACETAMINOPHEN 650 MG: 325 TABLET ORAL at 02:10

## 2017-10-22 RX ADMIN — HYDROXYZINE PAMOATE 50 MG: 50 CAPSULE ORAL at 02:10

## 2017-10-22 RX ADMIN — VENLAFAXINE HYDROCHLORIDE 225 MG: 75 CAPSULE, EXTENDED RELEASE ORAL at 08:10

## 2017-10-22 RX ADMIN — HYDROXYZINE PAMOATE 50 MG: 50 CAPSULE ORAL at 11:10

## 2017-10-22 RX ADMIN — LISINOPRIL 5 MG: 2.5 TABLET ORAL at 08:10

## 2017-10-22 RX ADMIN — THERA TABS 1 TABLET: TAB at 08:10

## 2017-10-22 RX ADMIN — Medication 1 TABLET: at 08:10
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
## 2017-10-22 NOTE — ASSESSMENT & PLAN NOTE
- Began short Valium taper 10/19, last dose 5 mg 0900 10/21  - VS q4 hours to monitor for WD  - Recommended patient abstain from benzodiazepine use    No s/sx complicated withdrawal - cont to monitor.  Benzo taper now complete - perhaps some insomnia but otherwise tolerating well.

## 2017-10-22 NOTE — ASSESSMENT & PLAN NOTE
- Restarted Effexor 225 mg daily on unit  - Trial of Remeron 15 mg qHS for insomnia - initiated 10/20  - Vistaril PRN insomnia, anxiety  - Will continue to coordinate with family    Current diagnosis Adjustment Disorder per primary team, but she may have MDD and/or anxiety spectrum disorder

## 2017-10-22 NOTE — SUBJECTIVE & OBJECTIVE
Interval History: patient brighter, more hopeful, interactive in milieu.  She had a difficult night of sleep - which may have been partly due to completion of benzo taper earlier in the day, though she felt it was due to noise on unit.  She is eager for discharge.  She denies depressed mood or SI.  No new complaints today.    PMHx  Past Medical History Reviewed    ROS  Neuro: no tremor  GI: mild constipation      EXAMINATION    VITALS   Vitals:    10/22/17 0805   BP: 133/87   Pulse: 91   Resp: 16   Temp: 97.6 °F (36.4 °C)       CONSTITUTIONAL  General Appearance: casually dressed, appears stated age     MUSCULOSKELETAL  Muscle Strength and Tone: no weakness or spasticity  Abnormal Involuntary Movements: no tremor noted  Gait and Station: ambulates without assistance    PSYCHIATRIC   Level of Consciousness: alert  Orientation: to person, place, time  Grooming: mildly disheveled  Psychomotor Behavior: no retardation or agitation  Speech: conversational, mildly decreased spontaneity  Language: fluent english, repeats words/phrases  Mood: better  Affect: mildly constricted but per staff overall brighter, able to smile  Thought Process: linear  Associations: intact, no loosening of associations  Thought Content: denies SI  Memory: grossly intact  Attention: not distractible  Fund of Knowledge: intact  Insight: improving  Judgment: improving    Family History     Problem Relation (Age of Onset)    Anxiety disorder Daughter    Asperger's syndrome Daughter    Hypertension Father        Social History Main Topics    Smoking status: Never Smoker    Smokeless tobacco: Never Used    Alcohol use No    Drug use: No    Sexual activity: Yes     Partners: Male     Psychotherapeutics     Start     Stop Route Frequency Ordered    10/20/17 2100  mirtazapine tablet 15 mg      -- Oral Nightly 10/20/17 0940    10/19/17 1215  venlafaxine 24 hr capsule 225 mg      -- Oral Daily 10/19/17 1106    10/19/17 1206  lorazepam tablet 2 mg       -- Oral Every 4 hours PRN 10/19/17 1106           Review of Systems  Objective:     Vital Signs (Most Recent):  Temp: 97.6 °F (36.4 °C) (10/22/17 0805)  Pulse: 91 (10/22/17 0805)  Resp: 16 (10/22/17 0805)  BP: 133/87 (10/22/17 0805) Vital Signs (24h Range):  Temp:  [97.6 °F (36.4 °C)-97.8 °F (36.6 °C)] 97.6 °F (36.4 °C)  Pulse:  [80-91] 91  Resp:  [16-18] 16  BP: (129-145)/(73-87) 133/87     Height: 5' (152.4 cm)  Weight: 81.6 kg (180 lb)  Body mass index is 35.15 kg/m².    No intake or output data in the 24 hours ending 10/22/17 1027    Physical Exam     Significant Labs:   Last 24 Hours:   Recent Lab Results     None          Significant Imaging: None

## 2017-10-22 NOTE — ASSESSMENT & PLAN NOTE
remeron should help with sleep - initiated 10/20/17  Difficulty with sleep 10/21/17 - which may be due to cessation of benzo taper - vistaril prn available  Could consider melatonin in future if difficulties continue

## 2017-10-22 NOTE — PROGRESS NOTES
"[facility]  Psychiatry  Progress Note    Patient Name: Rosie Izaguirre  MRN: [id number]   Code Status: Full Code  Admission Date: 10/18/2017  Hospital Length of Stay: 4 days  Expected Discharge Date:   Attending Physician: Rodrigue Dill Jr., MD  Primary Care Provider: Hang Mai MD    [place] [facility]    Patient information was obtained from patient.     Subjective:     Principal Problem:Adjustment disorder with mixed disturbance of emotions and conduct    Chief Complaint: s/p overdose    HPI: HPI:  Per initial consult note:  Patient Rosie Izaguirre was brought to the hospital by her sisters who were concerned for her behaviors for the past couple of days.  Patient states that she got into a fight with her  on Sunday which ended in a decision to split up.  He is leaving this Friday to go back to [place].  She decided to "sleep" until he left.  She took 10-15 Xanax 0.5mg tablets on Monday, yesterday, and again today.  She has been in bed for 3 days and has not gone to work.  Patient is interviewed in her room and is noted to be slurred and somewhat slowed in her responses.  She admits to about a 15 year history of depression treated by an outpatient psychiatrist (Oscar Mckeon MD) for the duration.  She admits to being "fine" until this weekend with an occasional dip in her mood "about every couple of months".  She has been sad, depressed, and lacking motivation since Sunday.  The fight with her  was over the fact that he was purchasing marijuana and she in not in agreement with drug use.  She denies any anxiety, manic, or psychotic history.  Biggest complaint is sleep.  Has been on zolpidem for years.  Also takes Effexor XR 225mg daily for about 9 years.  In the past has failed Paxil and Lexapro.  He has also prescribed her Xanax but no prescriptions written in past year or so.  He also treats her daughter (Aspergers and anxiety) and does write for her Xanax also.  " "This information was obtained in a conversation with Dr. Mckeon on the phone and he asks that primary treatment team contact him prior to discharge to coordinate outpatient follow up.  Patient and her psychiatrist deny any previous suicide attempts or self harm.  Says that the only gun in her home is an "airsoft" gun.  Note that her blood pressure is elevated.    Inpatient psychiatric interview:  She reports her sisters brought her in to the hospital today because they knew something was wrong. She states on Sunday she and her  started fighting because she found out he had been buying weed. She states this escalated in verbal arguments until they decided to get . She states for the past 3 days she has been trying to sleep through the pain because she didn't want to deal with it. So she took 10-15 xanax 0.5 mg tablets per day in order to sleep constantly; last took xanax yesterday afternoon. She denies having any intention of trying to harm or kill herself. She states prior to this acute episode she had been doing well but can recall a recent MDE lasting about 2 weeks in May of this year but was able to "bounce back." She reports she has been complaint with home dose of Effexor. Denies any prior hospitalizations or prior suicide attempts. Denies history of substance abuse or rehab. Denies having any other medical problems. She is wearing a splint on her right pinky finger due punching the wall on Sunday and was diagnosed with a broken finger.    Collateral Information:  Cynthia Palma, sister ([phone]), obtained by Britt Harden: :   Sees patient almost daily; lives within walking distance. Patient doing worse over past few months, possibly longer. Slowly stopped taking care of responsibilities (housework, being on time). Decreased visits with sister. Sister does not believe patient would attempt to hurt self (accidental or on purpose) or hurt others.      Progressively more miserable over past " "months.   1. Reactions out of proportion with situation: divorce and sleep until  leaves because she is upset with    2. 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 Negativity: stating "if they can't make it a whole week without me, I should leave," despite the fact that the boss is understanding and gives pt leeway      Possibly triggered by:  1.  working in [employer] last 4-5 months; patient not adjusting well. 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 Possibly jealousy when  is gone.   2. 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 Daughter started college in August and moved away from home.  4.  not dealing well with increased depression. (ongoing for past month, not only since he came home). Per sister,  would like marriage counseling; no divorce papers/etc have been started.      Psych History:  Patient has been on antidepressants for last 18 years. Effexor for last 12. Tried other meds before. Patient has mood dips, but usually can compartmentalize and pretend like nothing is going on. Sister unsure if mood dips are happening more frequently. Patient has never "accidentally" or intentionally overdosed, never been hospitalized for psychiatric condition.     Patient used to cope with stress/anxiety/depression with medication and by talking with family (sisters, parents, grandma live nearby). More recently, patient joselin by avoidance - lie in bed and play games on phone.      Family Psych History:  Sister - mixed bipolar (currently on lamictal, seroquel, previously on zoloft).   Grandma's mother - possibly bipolar  Grandma - depression  Mother and mother's four sisters - on antidepressants at some point (mother has " "been on cymbalta for past few years)      Sister's Concerns:   1. Get patient more "in touch with reality"     2. Possible non-medication therapy for patient as adjunct to meds?     3. Is current med (effexor) not working anymore; change med?     4. Is patient possibly mixed bipolar also?     5. 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       (Sofie Izaguirre [phone], [phone]), obtained by Ms3 Toño Harden:   Works offshore, depending on length of projects, generally sees patient for 1 week durations every 2-3 weeks; have been together for 23 years.  denies patient ever attempting suicide or accidentally taking too many pills. Denies that patient would actively or passively attempt suicide.  believes problems started in August, when he failed drug test (adderall). He reports that the patient had a downward trend at that time, as she was upset and hurt, and didn't want to talk with him. 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 However, he feels that this most recent incident (marijuana) resulted in the patient's loss of trust in him, and precipitated her increased depression. He blames self for patient's current condition. He reports motivation to regain trust/seek counseling.  He reports that he attempted to visit patient yesterday while patient was still at [facility], but was turned away by nursing. He is unsure of the reason.      's concerns:  1. When will pt be discharged home? He would like for her condition to improve, but is trying to figure out if he can go back [place] Saturday.  2. Can he speak with patient? (re: he was turned away at [facility])     PPH:  -Outpatient psychiatrist: Oscar Pompa, follows up q 3 months  -no prior suicide attempts  -diagnosed with depression at 21 years of " "age  -hospitalizations: none  -medication trials: celexa, lexapro, has been on Effexor 225 mg PO qd for the last 15 years. 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 Pt with no prior psychiatric hospitalizations was admitted following an overdose of her daughter's Xanax.  She denies she was suicidal, stating instead that, after an argument with her , she wanted to "sleep for 3 days" until he returned to work offshore. On interview, Pt was alert, with passive, lethargic demeanor and restricted affect.  She denied suicidal or violent thoughts, or any s/s of psychosis.  She endorsed current withdrawal from Effexor 225 mg per day which she has not had for up to 3 days. Pt agreed that team could speak with her family, including her .  Effexor will be restarted. Additional lab will be ordered. Valium detox.   10/20/17: Appears brighter. Tolerating Valium taper, no signs Wd. C/o insomnia, will start Remeron 15 mg qHS.   10/21/17 - slept well, more interactive in milieu, no s/sx complicated withdrawal noted.  10/22/17 - improving on unit, brighter mood, interactive in milieu, difficulty with sleep last night, valium taper completed    Interval History: patient brighter, more hopeful, interactive in milieu.  She had a difficult night of sleep - which may have been partly due to completion of benzo taper earlier in the day, though she felt it was due to noise on unit.  She is eager for discharge.  She denies depressed mood or SI.  No new complaints today.    PMHx  Past Medical History Reviewed    ROS  Neuro: no tremor  GI: mild constipation      EXAMINATION    VITALS   Vitals:    " 10/22/17 0805   BP: 133/87   Pulse: 91   Resp: 16   Temp: 97.6 °F (36.4 °C)       CONSTITUTIONAL  General Appearance: casually dressed, appears stated age     MUSCULOSKELETAL  Muscle Strength and Tone: no weakness or spasticity  Abnormal Involuntary Movements: no tremor noted  Gait and Station: ambulates without assistance    PSYCHIATRIC   Level of Consciousness: alert  Orientation: to person, place, time  Grooming: mildly disheveled  Psychomotor Behavior: no retardation or agitation  Speech: conversational,  mildly decreased spontaneity  Language: fluent english, repeats words/phrases  Mood: better  Affect: mildly constricted but per staff overall brighter, able to smile  Thought Process: linear  Associations: intact, no loosening of associations  Thought Content: denies SI  Memory: grossly intact  Attention: not distractible  Fund of Knowledge: intact  Insight: improving  Judgment: improving    Family History     Problem Relation (Age of Onset)    Anxiety disorder Daughter    Asperger's syndrome Daughter    Hypertension Father        Social History Main Topics    Smoking status: Never Smoker    Smokeless tobacco: Never Used    Alcohol use No    Drug use: No    Sexual activity: Yes     Partners: Male     Psychotherapeutics     Start     Stop Route Frequency Ordered    10/20/17 2100  mirtazapine tablet 15 mg      -- Oral Nightly 10/20/17 0940    10/19/17 1215  venlafaxine 24 hr capsule 225 mg      -- Oral Daily 10/19/17 1106    10/19/17 1206  lorazepam tablet 2 mg      -- Oral Every 4 hours PRN 10/19/17 1106           Review of Systems  Objective:     Vital Signs (Most Recent):  Temp: 97.6 °F (36.4 °C) (10/22/17 0805)  Pulse: 91 (10/22/17 0805)  Resp: 16 (10/22/17 0805)  BP: 133/87 (10/22/17 0805) Vital Signs (24h Range):  Temp:  [97.6 °F (36.4 °C)-97.8 °F (36.6 °C)] 97.6 °F (36.4 °C)  Pulse:  [80-91] 91  Resp:  [16-18] 16  BP: (129-145)/(73-87) 133/87     Height: 5' (152.4 cm)  Weight: 81.6 kg (180 lb)  Body  mass index is 35.15 kg/m².    No intake or output data in the 24 hours ending 10/22/17 1027    Physical Exam     Significant Labs:   Last 24 Hours:   Recent Lab Results     None          Significant Imaging: None    Assessment/Plan:     * Adjustment disorder with mixed disturbance of emotions and conduct    - Restarted Effexor 225 mg daily on unit  - Trial of Remeron 15 mg qHS for insomnia - initiated 10/20  - Vistaril PRN insomnia, anxiety  - Will continue to coordinate with family    Current diagnosis Adjustment Disorder per primary team, but she may have MDD and/or anxiety spectrum disorder        Sedative hypnotic or anxiolytic dependence    - Began short Valium taper 10/19, last dose 5 mg 0900 10/21  - VS q4 hours to monitor for WD  - Recommended patient abstain from benzodiazepine use    No s/sx complicated withdrawal - cont to monitor.  Benzo taper now complete - perhaps some insomnia but otherwise tolerating well.          Suicide attempt by substance overdose    -continue PEC and inpatient psychiatric hospitalization for danger to self  -resume home dose of Effexor in Am, consider addition of mood stabilizer  - patient on dangers of misuse of medications        Microcytic anemia    - Continue iron supplementation         Insomnia due to mental disorder    remeron should help with sleep - initiated 10/20/17  Difficulty with sleep 10/21/17 - which may be due to cessation of benzo taper - vistaril prn available  Could consider melatonin in future if difficulties continue             Need for Continued Hospitalization:   Protective inpatient psychiatric hospitalization required while a safe disposition plan is enacted. and Requires ongoing hospitalization for stabilization of medications.    Anticipated Disposition: Home or Self Care    Yadiel Ennis MD   Psychiatry  [facility]

## 2017-10-22 NOTE — PLAN OF CARE
Problem: Patient Care Overview (Adult)  Goal: Plan of Care Review  Outcome: Ongoing (interventions implemented as appropriate)  Patient calm and cooperative. Endorses improving. Denies suicidal ideation. Superficial interaction with staff. Med compliant. Attended all unit activities. Vital signs stable. Denies drug withdrawal.

## 2017-10-23 PROCEDURE — 99232 SBSQ HOSP IP/OBS MODERATE 35: CPT | Mod: ,,, | Performed by: PSYCHIATRY & NEUROLOGY

## 2017-10-23 PROCEDURE — 25000003 PHARM REV CODE 250: Performed by: PSYCHIATRY & NEUROLOGY

## 2017-10-23 PROCEDURE — 97150 GROUP THERAPEUTIC PROCEDURES: CPT

## 2017-10-23 PROCEDURE — 90853 GROUP PSYCHOTHERAPY: CPT | Mod: ,,, | Performed by: PSYCHOLOGIST

## 2017-10-23 PROCEDURE — 25000003 PHARM REV CODE 250: Performed by: STUDENT IN AN ORGANIZED HEALTH CARE EDUCATION/TRAINING PROGRAM

## 2017-10-23 PROCEDURE — 12400001 HC PSYCH SEMI-PRIVATE ROOM

## 2017-10-23 RX ADMIN — FERROUS SULFATE TAB EC 325 MG (65 MG FE EQUIVALENT) 325 MG: 325 (65 FE) TABLET DELAYED RESPONSE at 08:10

## 2017-10-23 RX ADMIN — HYDROXYZINE PAMOATE 50 MG: 50 CAPSULE ORAL at 08:10

## 2017-10-23 RX ADMIN — MIRTAZAPINE 15 MG: 15 TABLET, FILM COATED ORAL at 08:10

## 2017-10-23 RX ADMIN — LISINOPRIL 5 MG: 2.5 TABLET ORAL at 08:10

## 2017-10-23 RX ADMIN — ACETAMINOPHEN 650 MG: 325 TABLET ORAL at 09:10

## 2017-10-23 RX ADMIN — VENLAFAXINE HYDROCHLORIDE 225 MG: 75 CAPSULE, EXTENDED RELEASE ORAL at 08:10

## 2017-10-23 RX ADMIN — THERA TABS 1 TABLET: TAB at 08:10

## 2017-10-23 RX ADMIN — ACETAMINOPHEN 650 MG: 325 TABLET ORAL at 11:10

## 2017-10-23 RX ADMIN — Medication 1 TABLET: at 08:10
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
## 2017-10-23 NOTE — ASSESSMENT & PLAN NOTE
remeron should help with sleep - initiated 10/20/17  Difficulty with sleep 10/21/17 - which may be due to cessation of benzo taper - vistaril prn available (took last night - 10/22/17)  Could consider melatonin in future if difficulties continue

## 2017-10-23 NOTE — PROGRESS NOTES
Acute Psychiatric Unit  Psychosocial History and Assessment  Progress Note      Patient Name: Rosie Izaguirre YOB: 1980 SW: Madalyn Salas [facility] Date: 10/23/2017    Chief Complaint:     Consent:     Did the patient consent for an interview with the ?  Yes in treatment team meetings.    Did the patient consent for the  to contact family/friend/caregiver?  Yes sister Cynthia Palma.      Did the patient give consent for the  to inform family/friend/caregiver of his/her whereabouts or to discuss discharge planning?  Yes to her sister    Source of Information: Pt self, sister records    Is information obtained from interviews considered reliable? Yes      Reason for Admission:     Active Hospital Problems    Diagnosis  POA    *Adjustment disorder with mixed disturbance of emotions and conduct [F43.25]  Yes    Sedative hypnotic or anxiolytic dependence [F13.20]  Yes    Microcytic anemia [D50.9]  Yes     Noted in 2014      Insomnia due to mental disorder [F51.05]  Yes      Resolved Hospital Problems    Diagnosis Date Resolved POA   No resolved problems to display.       History of Present Illness - (Patient Perception):  Depressed for a long time  Marital problems      History of Present Illness - (Perception of Others):      Present biopsychosocial functioning: Pt is   Lives with  in [place].   [place] on work off 2 weeks home with family.     Past biopsychosocial functioning: ***    Family and Marital/Relationship History:     Significant Other/Partner Relationships:  : {[facility] PSYCHOSOCIAL ASSESS RELATIONSHIP ASSESS [facility]:[id number]},   and ***    Family Relationships:  having marital issues with spouse.     Culture and Taoist:     Taoist: Scientologist    How strong of a role does Judaism and spirituality play in patient's life? Not sure    Scientology or spiritual concerns regarding treatment:  Unknown    History of Abuse:   History of Abuse: Denied    Outcome: None    Psychiatric and Medical History:     History of psychiatric illness or treatment: psychotropic management by PCP, has participated in counseling/psychotherapy on an outpatient basis in the past and currently under psychiatric care    Medical history:   Past Medical History:   Diagnosis Date    Hx of psychiatric care     Hypertension     Psychiatric problem     Therapy        Substance Abuse History:     Alcohol - (Patient Perspective): NONE  History   Alcohol Use No       Alcohol - (Collateral Perspective): NONE    Drugs - (Patient Perspective): NONE  History   Drug Use No       Drugs - (Collateral Perspective): No    Additional Comments: NONE    Education:     Currently Enrolled? No    Special Education? No    Interested in Completing Education/GED: No completed high school.    Employment and Financial:     Currently employed? employed    Source of Income: employed    Able to afford basic needs (food, shelter, utilities)? Yes    Who manages finances/personal affairs? Spouse and self      Service:     [place]? No    Combat Service? No     Community Resources:     Describe present use of community resources: None     Identify previously used community resources   (Include previous mental health treatment - outpatient and inpatient):  Used outpatient psychiatrist office  Dr. Harding  For follow up appointment and medication managements.     Environmental:     Current living situation:  Lives with spouse    Social Evaluation:     Patient Assets: General knowledge, independant  Living employed.     Patient Limitations: Family issues marital problems    High risk psychosocial issues that may impact discharge planning:   inability to afford medications or follow up outpatient treatment    Recommendations:     Anticipated discharge plan:   {[facility] PSYCHOSOCIAL ASSESS DISCHARGE PLAN [facility]:12612}    High risk issues requiring early treatment  planning and immediate intervention: ***    Community resources needed for discharge planning:  {[facility] PSYCHOSOCIAL ASSESS COM RESOURCES [facility]:8301}    Anticipated social work role(s) in treatment and discharge planning: ***

## 2017-10-23 NOTE — PROGRESS NOTES
10/23/17 1300   Patient/Family Goals   Goal Formulation With patient   Time For Goal Achievement 3 days   Goal 1 attend activities relaxation and education groups   Assessment   Leisure Interest Enjoy Nature;Walk;Listen to Music;Enjoy Family/Friends;Sports   Leisure Barriers Poor Impulse Control;Communication;Fears/Phobias;Social Support   Treatment Focus To Improve Mood;To Decrease Agitation and Increase Frustration Tolerance;To Improve Coping Skills

## 2017-10-23 NOTE — PROGRESS NOTES
Group Psychotherapy (PhD/LCSW)    Site: [facility]    Clinical status of patient: Inpatient    Date: 10/23/2017    Group Focus: Life Skills    Length of service: [id number] - 35-40 minutes    Number of patients in attendance: 7    Referred by: Acute Psychiatry Unit Treatment Team    Target symptoms: Substance Abuse, Depression and Adjustment Disorder    Patient's response to treatment: Active Listening     Progress toward goals: Progressing slowly    Interval History: Pt appeared alert and attentive in group. Pt participated appropriately in a group focused common problems in communication that arise from differences in gender, fx of origin background, ethnic background, etc,  and how to overcome same.      Diagnosis: Adjustment d/o with mixed disturbance of emotions and conduct     Plan: Continue treatment on [facility]
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
## 2017-10-23 NOTE — PROGRESS NOTES
FAMILY MEETING SCHEDULED WITH PT'S SISTER  KIMBERLY DURAND [phone] OTHER SISTER FANNY MAY ATTEND IF SHE CAN GET OFF WORK.  MEETING SCHEDULE FOR 10:30 Tuesday MORNING.

## 2017-10-23 NOTE — MEDICAL/APP STUDENT
[facility] L[facility] Medical Student Progress Note    Patient: Rosie Izaguirre  MRN: [id number]  Admission Date: 10/18/2017  Hospital Length of Stay: 5  Expected Discharge Date:   Primary Attending: Rodrigue Dill Jr., MD    Legal status: CEC until 11/02 at 1402    Subjective:     Principal problem: Adjustment disorder with mixed disturbance of emotions and conduct    History of present illness: See H&P    Patient reports that she slept well overnight once she was in the quiet room.  She endorses good appetite and energy. She reports that she feels better, and denies SI/HI/AVH. She also denies withdrawal symptoms.     Per nursing: patient slept 4 hrs last night in the quiet room after taking vistaril.      Objective:     Vitals (most recent): T 97.5, P 100, R 16, /83  Vitals (last 24 hrs):   Temp:  [97.5 °F (36.4 °C)-98.3 °F (36.8 °C)]   Pulse:  []   Resp:  [16]   BP: (136-151)/(82-88)   No intake/output data recorded.    Constitutional  General Appearance: casually dressed, wearing eyeglasses    MUSCULOSKELETAL  Muscle Strength and Tone: no weakness or spasticity appreciated  Abnormal Involuntary Movements: no BUE tremors appreciated on extension  Gait and Station: ambulates without assistance     Psychiatric   Level of Consciousness: alert  Orientation: to person, place, date  Grooming: mildly disheveled  Psychomotor Behavior: no retardation or agitation appreciated  Speech: conversational  Language: fluent english  Mood: better  Affect: mildly constricted, smiles spontaneously  Thought Process: linear   Thought Content: denies SI/HI/AVH  Memory: remote and recent intact  Attention: intact to conversation  Fund of Knowledge: intact  Insight: improving  Judgment: improving    Labs:    Trended Lab Data:    Recent Labs  Lab 10/18/17  1350   WBC 10.64   HGB 11.5*   HCT 35.4*   *   MCV 81*   RDW 14.6*      K 3.5      CO2 28   BUN 8   CREATININE 0.8   GLU 77   PROT 7.8   ALBUMIN 3.7   BILITOT  0.3   AST 24   ALKPHOS 104   ALT 20     Currently treated with:  Psychotherapeutics     Start     Stop Route Frequency Ordered    10/20/17 2100  mirtazapine tablet 15 mg      -- Oral Nightly 10/20/17 0940    10/19/17 1215  venlafaxine 24 hr capsule 225 mg      -- Oral Daily 10/19/17 1106    10/19/17 1206  lorazepam tablet 2 mg      -- Oral Every 4 hours PRN 10/19/17 1106          Scheduled Meds:   ferrous sulfate  325 mg Oral Daily    folic acid-vit B6-vit B12 2.5-25-2 mg  1 tablet Oral Daily    lisinopril  5 mg Oral Daily    mirtazapine  15 mg Oral QHS    multivitamin  1 tablet Oral Daily    venlafaxine  225 mg Oral Daily     Continuous Infusions:   PRN Meds:.acetaminophen, calcium carbonate, famotidine, hydrOXYzine pamoate, hydrOXYzine pamoate, loperamide, lorazepam, magnesium hydroxide 400 mg/5 ml      Assessment:     Rosie Izaguirre is a 36 y.o. female with:    Axis I: none  Axis II: Adjustment disorder with mixed disturbance of emotions and conduct  Axis III: GERD, HTN, microcytic anemia  Axis IV: deferred  Axis V: 61-70      Plan:     Adjustment disorder with mixed disturbance of emotions and conduct: -patient admitted 2/2 Xanax overdose  -patient currently denies suicide ideations, reports that she slept well overnight  -during treatment team, medications and side-effects were discussed  -continue PEC and inpatient psychiatric hospitalization for danger to self  -continue Effexor 225 mg PO daily  -continue  Hydroxyzine (vistaril) 50 mg PO PRN for insomnia     Sedative hypnotic or anxiolytic dependence:  -patient denies withdrawal symptoms  -continue monitoring vital signs q4hrs, tremors, signs of withdrawal     Microcytic anemia:  -continue iron supplementation      Hospital Day: 5   Dispo: pending family meeting 10/24    Toño Harden  [facility], L3  10/23/2017 9:32 AM

## 2017-10-23 NOTE — ASSESSMENT & PLAN NOTE
- Restarted Effexor 225 mg daily on unit (continue)  - Trial of Remeron 15 mg qHS for insomnia - initiated 10/20 (continue)  - Vistaril PRN insomnia, anxiety  - Will continue to coordinate with family  - Current diagnosis Adjustment Disorder per primary team, but she may have MDD and/or anxiety spectrum disorder  - Order CMP and CBC for tomorrow AM (10/24/17)  - Family meeting for tomorrow (10/24/17)

## 2017-10-23 NOTE — PLAN OF CARE
Pt displayed a bright affect. Calm and cooperative. Attended unit activities and was medication compliant. Denies SI/HI. Denies AH/VH. Complained of tooth pain and was given tylenol. NAD observed. Will cont to monitor.

## 2017-10-23 NOTE — SUBJECTIVE & OBJECTIVE
Interval History: Denies WD symptoms, c/o chronic toothache, reports need for root canal. Reports she can sleep if it's quiet. Discussed plan for family meeting with sister tomorrow prior to discharge.  [place], plans to contact her psychiatrist for recommendations for marital counseling. If he does not agree will discuss separation. Denies SI/HI.    PMHx  Past Medical History Reviewed    ROS  Negative except toothache      EXAMINATION    VITALS   Vitals:    10/23/17 0800   BP: (!) 147/83   Pulse: 100   Resp: 16   Temp: 97.5 °F (36.4 °C)       CONSTITUTIONAL  General Appearance: casually dressed, appears stated age     MUSCULOSKELETAL  Muscle Strength and Tone: no weakness or spasticity  Abnormal Involuntary Movements: no tremor noted  Gait and Station: ambulates without assistance     PSYCHIATRIC   Level of Consciousness: alert  Orientation: to person, place, time  Grooming: fair  Psychomotor Behavior: no retardation or agitation  Speech: conversational, normal volume, rate and tone  Language: fluent English, repeats words/phrases  Mood: improved  Affect: mildly constricted   Thought Process: linear  Associations: intact, no loosening of associations  Thought Content: denies SI/HI  Memory: grossly intact  Attention: not distractible  Fund of Knowledge: intact  Insight: improving  Judgment: improving      Family History     Problem Relation (Age of Onset)    Anxiety disorder Daughter    Asperger's syndrome Daughter    Hypertension Father        Social History Main Topics    Smoking status: Never Smoker    Smokeless tobacco: Never Used    Alcohol use No    Drug use: No    Sexual activity: Yes     Partners: Male     Psychotherapeutics     Start     Stop Route Frequency Ordered    10/20/17 2100  mirtazapine tablet 15 mg      -- Oral Nightly 10/20/17 0940    10/19/17 1215  venlafaxine 24 hr capsule 225 mg      -- Oral Daily 10/19/17 1106    10/19/17 1206  lorazepam tablet 2 mg       -- Oral Every 4 hours PRN 10/19/17 1106           Review of Systems  Objective:     Vital Signs (Most Recent):  Temp: 97.5 °F (36.4 °C) (10/23/17 0800)  Pulse: 100 (10/23/17 0800)  Resp: 16 (10/23/17 0800)  BP: (!) 147/83 (10/23/17 0800) Vital Signs (24h Range):  Temp:  [97.5 °F (36.4 °C)-98.3 °F (36.8 °C)] 97.5 °F (36.4 °C)  Pulse:  [] 100  Resp:  [16] 16  BP: (136-151)/(82-88) 147/83     Height: 5' (152.4 cm)  Weight: 81.6 kg (180 lb)  Body mass index is 35.15 kg/m².    No intake or output data in the 24 hours ending 10/23/17 0938    Physical Exam     Significant Labs:   Last 24 Hours:   Recent Lab Results     None          Significant Imaging: None

## 2017-10-23 NOTE — ASSESSMENT & PLAN NOTE
- Began short Valium taper 10/19, last dose 5 mg 0900 10/21  - VS q4 hours to monitor for WD  - Recommended patient abstain from benzodiazepine use    No s/sx complicated withdrawal - cont to monitor (mild elevations in BP and HR).  Benzo taper now complete - perhaps some insomnia but otherwise tolerating well.

## 2017-10-23 NOTE — PROGRESS NOTES
10/23/17 0900 10/23/17 1100 10/23/17 1300   [facility] Group Therapy   Group Name Community Reintegration Education Therapeutic Recreation   Specific Interventions Current Events Relaxation Training Crafts   Participation Level Active;Appropriate;Attentive Active;Appropriate;Attentive Active;Appropriate;Attentive   Participation Quality Cooperative;Social Cooperative;Social Cooperative;Social   Insight/Motivation Good Good Good   Affect/Mood Display Appropriate Appropriate Appropriate   Cognition Alert;Oriented Alert;Oriented Alert;Oriented

## 2017-10-24 VITALS
SYSTOLIC BLOOD PRESSURE: 160 MMHG | HEIGHT: 60 IN | WEIGHT: 180 LBS | DIASTOLIC BLOOD PRESSURE: 84 MMHG | BODY MASS INDEX: 35.34 KG/M2 | TEMPERATURE: 98 F | RESPIRATION RATE: 16 BRPM | HEART RATE: 97 BPM

## 2017-10-24 LAB
ALBUMIN SERPL BCP-MCNC: 3.3 G/DL
ALP SERPL-CCNC: 110 U/L
ALT SERPL W/O P-5'-P-CCNC: 55 U/L
ANION GAP SERPL CALC-SCNC: 8 MMOL/L
AST SERPL-CCNC: 25 U/L
BASOPHILS # BLD AUTO: 0.02 K/UL
BASOPHILS NFR BLD: 0.3 %
BILIRUB SERPL-MCNC: 0.2 MG/DL
BUN SERPL-MCNC: 6 MG/DL
CALCIUM SERPL-MCNC: 9 MG/DL
CHLORIDE SERPL-SCNC: 106 MMOL/L
CO2 SERPL-SCNC: 27 MMOL/L
CREAT SERPL-MCNC: 0.7 MG/DL
DIFFERENTIAL METHOD: ABNORMAL
EOSINOPHIL # BLD AUTO: 0.1 K/UL
EOSINOPHIL NFR BLD: 0.8 %
ERYTHROCYTE [DISTWIDTH] IN BLOOD BY AUTOMATED COUNT: 14.9 %
EST. GFR  (AFRICAN AMERICAN): >60 ML/MIN/1.73 M^2
EST. GFR  (NON AFRICAN AMERICAN): >60 ML/MIN/1.73 M^2
GLUCOSE SERPL-MCNC: 87 MG/DL
HCT VFR BLD AUTO: 32 %
HGB BLD-MCNC: 10.1 G/DL
IMM GRANULOCYTES # BLD AUTO: 0.03 K/UL
IMM GRANULOCYTES NFR BLD AUTO: 0.4 %
LYMPHOCYTES # BLD AUTO: 3.4 K/UL
LYMPHOCYTES NFR BLD: 42.4 %
MCH RBC QN AUTO: 25.8 PG
MCHC RBC AUTO-ENTMCNC: 31.6 G/DL
MCV RBC AUTO: 82 FL
MONOCYTES # BLD AUTO: 0.6 K/UL
MONOCYTES NFR BLD: 8 %
NEUTROPHILS # BLD AUTO: 3.9 K/UL
NEUTROPHILS NFR BLD: 48.1 %
NRBC BLD-RTO: 0 /100 WBC
PLATELET # BLD AUTO: 439 K/UL
PMV BLD AUTO: 10.9 FL
POTASSIUM SERPL-SCNC: 3.7 MMOL/L
PROT SERPL-MCNC: 6.8 G/DL
RBC # BLD AUTO: 3.91 M/UL
SODIUM SERPL-SCNC: 141 MMOL/L
WBC # BLD AUTO: 7.99 K/UL

## 2017-10-24 PROCEDURE — 36415 COLL VENOUS BLD VENIPUNCTURE: CPT

## 2017-10-24 PROCEDURE — 85025 COMPLETE CBC W/AUTO DIFF WBC: CPT

## 2017-10-24 PROCEDURE — 80053 COMPREHEN METABOLIC PANEL: CPT

## 2017-10-24 PROCEDURE — 25000003 PHARM REV CODE 250: Performed by: PSYCHIATRY & NEUROLOGY

## 2017-10-24 PROCEDURE — 99239 HOSP IP/OBS DSCHRG MGMT >30: CPT | Mod: ,,, | Performed by: PSYCHIATRY & NEUROLOGY

## 2017-10-24 PROCEDURE — 25000003 PHARM REV CODE 250: Performed by: STUDENT IN AN ORGANIZED HEALTH CARE EDUCATION/TRAINING PROGRAM

## 2017-10-24 RX ORDER — VENLAFAXINE 75 MG/1
225 TABLET ORAL DAILY
Qty: 30 TABLET | Refills: 0 | Status: SHIPPED | OUTPATIENT
Start: 2017-10-24 | End: 2017-11-23

## 2017-10-24 RX ORDER — HYDROXYZINE PAMOATE 50 MG/1
50 CAPSULE ORAL NIGHTLY PRN
Qty: 30 CAPSULE | Refills: 0 | Status: SHIPPED | OUTPATIENT
Start: 2017-10-24 | End: 2019-08-06

## 2017-10-24 RX ORDER — MIRTAZAPINE 15 MG/1
15 TABLET, FILM COATED ORAL NIGHTLY
Qty: 30 TABLET | Refills: 0 | Status: SHIPPED | OUTPATIENT
Start: 2017-10-24 | End: 2019-08-06

## 2017-10-24 RX ADMIN — ACETAMINOPHEN 650 MG: 325 TABLET ORAL at 07:10

## 2017-10-24 RX ADMIN — LISINOPRIL 5 MG: 2.5 TABLET ORAL at 08:10

## 2017-10-24 RX ADMIN — Medication 1 TABLET: at 08:10

## 2017-10-24 RX ADMIN — THERA TABS 1 TABLET: TAB at 08:10

## 2017-10-24 RX ADMIN — VENLAFAXINE HYDROCHLORIDE 225 MG: 75 CAPSULE, EXTENDED RELEASE ORAL at 08:10

## 2017-10-24 RX ADMIN — FERROUS SULFATE TAB EC 325 MG (65 MG FE EQUIVALENT) 325 MG: 325 (65 FE) TABLET DELAYED RESPONSE at 08:10

## 2017-10-24 NOTE — DISCHARGE SUMMARY
"[facility]  Psychiatry  Discharge Summary      Patient Name: Rosie Izaguirre  MRN: [id number]  Admission Date: 10/18/2017  Hospital Length of Stay: 6 days  Discharge Date and Time:  10/24/2017 10:17 AM  Attending Physician: Rodrigue Dill Jr., MD   Discharging Provider: Karen Barnes MD  Primary Care Provider: Hang Mai MD    HPI:   HPI:  Per initial consult note:  Patient Rosie Izaguirre was brought to the hospital by her sisters who were concerned for her behaviors for the past couple of days.  Patient states that she got into a fight with her  on Sunday which ended in a decision to split up.  He is leaving this Friday to go back to [place].  She decided to "sleep" until he left.  She took 10-15 Xanax 0.5mg tablets on Monday, yesterday, and again today.  She has been in bed for 3 days and has not gone to work.  Patient is interviewed in her room and is noted to be slurred and somewhat slowed in her responses.  She admits to about a 15 year history of depression treated by an outpatient psychiatrist (Oscar Mckeon MD) for the duration.  She admits to being "fine" until this weekend with an occasional dip in her mood "about every couple of months".  She has been sad, depressed, and lacking motivation since Sunday.  The fight with her  was over the fact that he was purchasing marijuana and she in not in agreement with drug use.  She denies any anxiety, manic, or psychotic history.  Biggest complaint is sleep.  Has been on zolpidem for years.  Also takes Effexor XR 225mg daily for about 9 years.  In the past has failed Paxil and Lexapro.  He has also prescribed her Xanax but no prescriptions written in past year or so.  He also treats her daughter (Aspergers and anxiety) and does write for her Xanax also.  This information was obtained in a conversation with Dr. Mckeon on the phone and he asks that primary treatment team contact him prior to discharge to coordinate " "outpatient follow up.  Patient and her psychiatrist deny any previous suicide attempts or self harm.  Says that the only gun in her home is an "airsoft" gun.  Note that her blood pressure is elevated.    Inpatient psychiatric interview:  She reports her sisters brought her in to the hospital today because they knew something was wrong. She states on Sunday she and her  started fighting because she found out he had been buying weed. She states this escalated in verbal arguments until they decided to get . She states for the past 3 days she has been trying to sleep through the pain because she didn't want to deal with it. So she took 10-15 xanax 0.5 mg tablets per day in order to sleep constantly; last took xanax yesterday afternoon. She denies having any intention of trying to harm or kill herself. She states prior to this acute episode she had been doing well but can recall a recent MDE lasting about 2 weeks in May of this year but was able to "bounce back." She reports she has been complaint with home dose of Effexor. Denies any prior hospitalizations or prior suicide attempts. Denies history of substance abuse or rehab. Denies having any other medical problems. She is wearing a splint on her right pinky finger due punching the wall on Sunday and was diagnosed with a broken finger.    Collateral Information:  Cynthia Palma, sister ([phone]), obtained by MsKyung Harden: :   Sees patient almost daily; lives within walking distance. Patient doing worse over past few months, possibly longer. Slowly stopped taking care of responsibilities (housework, being on time). Decreased visits with sister. Sister does not believe patient would attempt to hurt self (accidental or on purpose) or hurt others.      Progressively more miserable over past months.   1. Reactions out of proportion with situation: divorce and sleep until  leaves because she is upset with    2. Blaming teacher for 6 yr " "old having trouble at school rather than taking responsibility/helping child do better   3. Negativity: stating "if they can't make it a whole week without me, I should leave," despite the fact that the boss is understanding and gives pt leeway      Possibly triggered by:  1.  working in [facility] last 4-5 months; patient not adjusting well. 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 Possibly jealousy when  is gone.   2. 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 Daughter started college in August and moved away from home.  4.  not dealing well with increased depression. (ongoing for past month, not only since he came home). Per sister,  would like marriage counseling; no divorce papers/etc have been started.      Psych History:  Patient has been on antidepressants for last 18 years. Effexor for last 12. Tried other meds before. Patient has mood dips, but usually can compartmentalize and pretend like nothing is going on. Sister unsure if mood dips are happening more frequently. Patient has never "accidentally" or intentionally overdosed, never been hospitalized for psychiatric condition.     Patient used to cope with stress/anxiety/depression with medication and by talking with family (sisters, parents, grandma live nearby). More recently, patient joselin by avoidance - lie in bed and play games on phone.      Family Psych History:  Sister - mixed bipolar (currently on lamictal, seroquel, previously on zoloft).   Grandma's mother - possibly bipolar  Grandma - depression  Mother and mother's four sisters - on antidepressants at some point (mother has been on cymbalta for past few years)      Sister's Concerns:   1. Get patient more "in touch with reality"     2. Possible non-medication therapy for patient as " adjunct to meds?     3. Is current med (effexor) not working anymore; change med?     4. Is patient possibly mixed bipolar also?     5. 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       (Sofie Izaguirre [phone], [phone]), obtained by Ms3 Toño Harden:   Works offshore, depending on length of projects, generally sees patient for 1 week durations every 2-3 weeks; have been together for 23 years.  denies patient ever attempting suicide or accidentally taking too many pills. Denies that patient would actively or passively attempt suicide.  believes problems started in August, when he failed drug test (adderall). He reports that the patient had a downward trend at that time, as she was upset and hurt, and didn't want to talk with him. 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 However, he feels that this most recent incident (marijuana) resulted in the patient's loss of trust in him, and precipitated her increased depression. He blames self for patient's current condition. He reports motivation to regain trust/seek counseling.  He reports that he attempted to visit patient yesterday while patient was still at [facility], but was turned away by nursing. He is unsure of the reason.      's concerns:  1. When will pt be discharged home? He would like for her condition to improve, but is trying to figure out if he can go back [place] Saturday.  2. Can he speak with patient? (re: he was turned away at [facility])     PPH:  -Outpatient psychiatrist: Oscar Pompa, follows up q 3 months  -no prior suicide attempts  -diagnosed with depression at 21 years of age  -hospitalizations: none  -medication trials: celexa lexapro, has been on Effexor 225 mg PO qd for the last 15 years. 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 Pt with no prior psychiatric hospitalizations was admitted following an overdose of her daughter's Xanax.  She denies she was suicidal, stating instead that, after an argument with her , she wanted to "sleep for 3 days" until he returned to work [place]. On interview, Pt was alert, with passive, lethargic demeanor and restricted affect.  She denied suicidal or violent thoughts, or any s/s of psychosis.  She endorsed current withdrawal from Effexor 225 mg per day which she has not had for up to 3 days. Pt agreed that team could speak with her family, including her .  Effexor will be restarted. Additional lab will be ordered. Valium detox.   10/20/17: Appears brighter. Tolerating Valium taper, no signs Wd. C/o insomnia, will start Remeron 15 mg qHS.   10/21/17 - slept well, more interactive in milieu, no s/sx complicated withdrawal noted.  10/22/17 - improving on unit, brighter mood, interactive in milieu, difficulty with sleep last night, valium taper completed  10/23/17 - continuing to improve; mood and affect improving; denies SI/HI/AH/VH; family meeting tomorrow; ordering CBC and CMP for tomorrow  10/24/17 - Had family meeting with two sisters Alethea and Cherelle. Discussed hospital course, medications and possible side effects. All concerns addressed. Will f/u with psychiatrist Dr Dailey and PCP Dr Mai.      * No surgery found *     Consults: none  Physical Exam      PSYCHIATRIC   Level of Consciousness: alert  Orientation: to person, place, time  Grooming: fair  Psychomotor Behavior: no retardation or agitation  Speech: conversational, normal volume, rate and tone  Language: fluent " English, repeats words/phrases  Mood: improved  Affect: euthymic   Thought Process: linear  Associations: intact, no loosening of associations  Thought Content: denies SI  Memory: grossly intact  Attention: not distractible  Fund of Knowledge: intact  Insight: fair  Judgment: improving    Significant Labs:   Last 24 Hours:   Recent Lab Results       10/24/17  0535      Immature Granulocytes 0.4     Immature Grans (Abs) 0.03     Albumin 3.3(L)     Alkaline Phosphatase 110     ALT 55(H)     Anion Gap 8     AST 25     Baso # 0.02     Basophil% 0.3     Total Bilirubin 0.2  Comment:  For infants and newborns, interpretation of results should be based  on gestational age, weight and in agreement with clinical  observations.  Premature Infant recommended reference ranges:  Up to 24 hours.............<8.0 mg/dL  Up to 48 hours............<12.0 mg/dL  3-5 days..................<15.0 mg/dL  6-29 days.................<15.0 mg/dL       BUN, Bld 6     Calcium 9.0     Chloride 106     CO2 27     Creatinine 0.7     Differential Method Automated     eGFR if African American >60.0     eGFR if non  >60.0  Comment:  Calculation used to obtain the estimated glomerular filtration  rate (eGFR) is the CKD-EPI equation. Since race is unknown   in our information system, the eGFR values for   -American and Non--American patients are given   for each creatinine result.       Eos # 0.1     Eosinophil% 0.8     Glucose 87     Gran # 3.9     Gran% 48.1     Hematocrit 32.0(L)     Hemoglobin 10.1(L)     Lymph # 3.4     Lymph% 42.4     MCH 25.8(L)     MCHC 31.6(L)     MCV 82     Mono # 0.6     Mono% 8.0     MPV 10.9     nRBC 0     Platelets 439(H)     Potassium 3.7     Total Protein 6.8     RBC 3.91(L)     RDW 14.9(H)     Sodium 141     WBC 7.99           Significant Imaging: None    Smoking Cessation:   Does the patient smoke? No  Does the patient want a prescription for Smoking Cessation? No  Does the patient want  counseling for Smoking Cessation? No         Pending Diagnostic Studies:     None        Final Active Diagnoses:    Diagnosis Date Noted POA    PRINCIPAL PROBLEM:  Adjustment disorder with mixed disturbance of emotions and conduct [F43.25] 10/19/2017 Yes    Sedative hypnotic or anxiolytic dependence [F13.20] 10/19/2017 Yes    Microcytic anemia [D50.9] 02/09/2017 Yes    Insomnia due to mental disorder [F51.05] 09/26/2014 Yes      Problems Resolved During this Admission:    Diagnosis Date Noted Date Resolved POA      * Adjustment disorder with mixed disturbance of emotions and conduct    - Restarted Effexor 225 mg daily on unit   - Trial of Remeron 15 mg qHS for insomnia - initiated 10/20 (continue)  - Vistaril PRN insomnia, anxiety  - Current diagnosis Adjustment Disorder per primary team, but she may have MDD and/or anxiety spectrum disorder        Sedative hypnotic or anxiolytic dependence    - Began short Valium taper 10/19, last dose 5 mg 0900 10/21  - VS q4 hours to monitor for Wd, denied WD symptoms  - Recommended patient abstain from benzodiazepine use, her daughter is currently out of the home at [facility]        Microcytic anemia    - Continue iron supplementation         Insomnia due to mental disorder    - Recommended stop Ambien upon discharge  - Trial of Remeron 15 mg qHS - initiated 10/20/17  - Vistaril 50 mg qHS PRN             Functional Condition: Independent communication skills, Can read/write and Independent with ADLs and basic life skills    Discharged Condition: stable    Disposition: Home or Self Care    Follow Up:  Follow-up Information     Oscar Pompa MD On 10/25/2017.    Specialty:  Psychiatry  Why:  APPOINTMENT ON 10/25/2017 10:30  Contact information:  [address] [place] [id number]  [phone]                 Patient Instructions:   No discharge procedures on file.  Medications:  Reconciled Home Medications:   Current Discharge Medication List      START taking  these medications    Details   folic acid-vit B6-vit B12 2.5-25-2 mg (FOLBIC OR EQUIV) 2.5-25-2 mg Tab Take 1 tablet by mouth once daily.  Qty: 30 tablet, Refills: 0      hydrOXYzine pamoate (VISTARIL) 50 MG Cap Take 1 capsule (50 mg total) by mouth nightly as needed (insomnia).  Qty: 30 capsule, Refills: 0      mirtazapine (REMERON) 15 MG tablet Take 1 tablet (15 mg total) by mouth every evening.  Qty: 30 tablet, Refills: 0      multivitamin (THERAGRAN) tablet Take 1 tablet by mouth once daily.  Qty: 30 tablet, Refills: 0         CONTINUE these medications which have CHANGED    Details   venlafaxine (EFFEXOR) 75 MG tablet Take 3 tablets (225 mg total) by mouth once daily.  Qty: 30 tablet, Refills: 0         CONTINUE these medications which have NOT CHANGED    Details   ferrous sulfate 325 mg (65 mg iron) Tab tablet Take 1 tablet (325 mg total) by mouth once daily.  Qty: 90 tablet, Refills: 1      lisinopril (PRINIVIL,ZESTRIL) 5 MG tablet Take 1 tablet (5 mg total) by mouth once daily.  Qty: 90 tablet, Refills: 3    Associated Diagnoses: Essential hypertension         STOP taking these medications       alprazolam (XANAX) 0.5 MG tablet Comments:   Reason for Stopping:         cloNIDine (CATAPRES) 0.1 MG tablet Comments:   Reason for Stopping:         lidocaine (LIDODERM) 5 % Comments:   Reason for Stopping:         ranitidine (ZANTAC) 300 MG tablet Comments:   Reason for Stopping:         zolpidem (AMBIEN) 10 mg Tab Comments:   Reason for Stopping:             Is patient being discharged on multiple antipsychotics? No    Time spent on the discharge of patient: 45 minutes    All elements of the transition record were discussed with the patient at discharge and patient agrees to this plan.    Karen Barnes MD  Psychiatry  [facility]

## 2017-10-24 NOTE — PLAN OF CARE
Problem: Patient Care Overview (Adult)  Goal: Plan of Care Review  Outcome: Ongoing (interventions implemented as appropriate)  POC discussed with pt, calm and cooperative on the unit. Follows direction and attends group with active participation. Med compliant, good hygiene,and good appetite. Denies SI/HI/AVH, bright affect, thoughts are linear. Mood is good. Out visible on the unit. Safety plan reviewed and environmental rounds done. Reviewed medicine with pt will require further instruction. Pt given time to ask questions, all questions answered. MVC in place will continue to monitor.     Problem: Depression (Adult,Obstetrics,Pediatric)  Goal: Establish/Maintain Self-Care Routine  Patient will demonstrate the desired outcomes by discharge/transition of care.   Outcome: Ongoing (interventions implemented as appropriate)  Pt has good hygiene and participates fully in unit activities.   Goal: Improved/Stable Mood  Patient will demonstrate the desired outcomes by discharge/transition of care.   Outcome: Ongoing (interventions implemented as appropriate)  Pt denies feelings of depression her only complaint is a toothache.

## 2017-10-24 NOTE — ASSESSMENT & PLAN NOTE
- Recommended stop Ambien upon discharge  - Trial of Remeron 15 mg qHS - initiated 10/20/17  - Vistaril 50 mg qHS PRN

## 2017-10-24 NOTE — PSYCH
Patient showered and dressed fairly well. Mood good.Denies SI/HI/AVH. Denies any drug withdrawal. Sociable with peers. Meet with treatment team and 2 sisters. Dr. Gonsalves and Dr. Dill discussed medications, follow up and discharge home instructions. Both patient and sister verbalized understanding. Copy of discharge home instructions given. Discharge ambulatory accompanied by sisters to home.

## 2017-10-24 NOTE — MEDICAL/APP STUDENT
"[facility] L[facility] Medical Student Progress Note    Patient: Rosie Izaguirre  MRN: [id number]  Admission Date: 10/18/2017  Hospital Length of Stay: 6  Expected Discharge Date:   Primary Attending: Rodrigue Dill Jr., MD    Legal status: [facility] until 11/02 at 1402    Subjective:     Principal problem: Adjustment disorder with mixed disturbance of emotions and conduct    History of present illness: See H&P    Patient feels improved today, looking forward to going home. She reports sleeping well overnight, and has improved energy. She endorses a good appetite. She denies SI/HI/AVH. She also reports that she has scheduled an appointment with Dr. Pompa for tomorrow.    Family meeting: Cynthia and Cherelle (sisters)  Discussed hospital course, and next steps. Discussion with Dr. Pompa and possible keeping sisters in the loop to help with coping with stressors. Also discussed medications and side effects, as well as dangers of taking non-prescribed medications (ie. Daughter's Xanax). Questions and concerns were addressed.       Objective:     Vitals (most recent): T 98, P 94, R 16, /89  Vitals (last 24 hrs):   Temp:  [97.5 °F (36.4 °C)-98.1 °F (36.7 °C)]   Pulse:  [76-97]   Resp:  [16]   BP: (135-163)/(78-89)   No intake/output data recorded.    Constitutional  General Appearance: casually dressed, wearing eyeglasses     Musculoskeletal  Muscle Strength and Tone: no weakness or spasticity appreciated  Abnormal Involuntary Movements: no tremors or dyskinesia appreciated  Gait and Station: ambulates without assistance    Psychiatric   Level of Consciousness: alert  Orientation: oriented to person, place  Grooming: mildly disheveled  Psychomotor Behavior: no retardation or agitation appreciated  Speech: conversational, spontaneous  Language: fluent english  Mood: "improved"  Affect: mildly constricted  Thought Process: linear  Associations: no loose associations appreciated  Thought Content: denies SI/HI/AVH  Memory: " remote and recent intact  Attention: intact to conversation  Fund of Knowledge: intact  Insight: fair  Judgment: fair    Labs:    Trended Lab Data:    Recent Labs  Lab 10/18/17  1350 10/24/17  0535   WBC 10.64 7.99   HGB 11.5* 10.1*   HCT 35.4* 32.0*   * 439*   MCV 81* 82   RDW 14.6* 14.9*    141   K 3.5 3.7    106   CO2 28 27   BUN 8 6   CREATININE 0.8 0.7   GLU 77 87   PROT 7.8 6.8   ALBUMIN 3.7 3.3*   BILITOT 0.3 0.2   AST 24 25   ALKPHOS 104 110   ALT 20 55*         Currently treated with:  Psychotherapeutics     Start     Stop Route Frequency Ordered    10/20/17 2100  mirtazapine tablet 15 mg      -- Oral Nightly 10/20/17 0940    10/19/17 1215  venlafaxine 24 hr capsule 225 mg      -- Oral Daily 10/19/17 1106    10/19/17 1206  lorazepam tablet 2 mg      -- Oral Every 4 hours PRN 10/19/17 1106          Scheduled Meds:   ferrous sulfate  325 mg Oral Daily    folic acid-vit B6-vit B12 2.5-25-2 mg  1 tablet Oral Daily    lisinopril  5 mg Oral Daily    mirtazapine  15 mg Oral QHS    multivitamin  1 tablet Oral Daily    venlafaxine  225 mg Oral Daily     Continuous Infusions:   PRN Meds:.acetaminophen, calcium carbonate, famotidine, hydrOXYzine pamoate, hydrOXYzine pamoate, loperamide, LORazepam, magnesium hydroxide 400 mg/5 ml      Assessment:     Rosie Izaguirre is a 36 y.o. female with:  Patient Active Problem List   Diagnosis    Obesity (BMI 35.0-39.9 without comorbidity)    Insomnia due to mental disorder    GERD (gastroesophageal reflux disease)    Essential hypertension    Spondylolisthesis of lumbar region    Microcytic anemia    Adjustment disorder with mixed anxiety and depressed mood    Suicide attempt by substance overdose    Adjustment disorder with mixed disturbance of emotions and conduct    Sedative hypnotic or anxiolytic dependence         Plan:     Adjustment disorder with mixed disturbance of emotions and conduct: -patient admitted 2/2 Xanax overdose  -patient  "currently denies suicide ideations, reports that she slept well overnight  -during treatment team, medications and side-effects were discussed  -continue PEC and inpatient psychiatric hospitalization for danger to self  -continue Effexor 225 mg PO daily  -continue Remeron (mirtazapine) 15 mg PO qHS for insomnia  -continue Fobic 1 tablet PO daily to "boost" anti-depressant effects  -follow up with Dr. Pompa after discharge from hospital     Sedative hypnotic or anxiolytic dependence:  -patient denies withdrawal symptoms  -substance use disorders were discussed with patient, as well as methods to avoid relapse (ie. Remove substance from home)     Microcytic anemia:  -continue iron supplementation    Hospital Day: 6  Dispo: Today, with sisters.     Toño Dereck  [facility], L3  10/24/2017 9:28 AM    "

## 2017-10-24 NOTE — ASSESSMENT & PLAN NOTE
- Began short Valium taper 10/19, last dose 5 mg 0900 10/21  - VS q4 hours to monitor for Wd, denied WD symptoms  - Recommended patient abstain from benzodiazepine use, her daughter is currently out of the home at college

## 2017-10-24 NOTE — PROGRESS NOTES
10/23/17 2000   [facility] Group Therapy   Group Name Community Reintegration   Specific Interventions Other (see comments)  (wrap up)   Participation Level Appropriate;Sharing   Participation Quality Cooperative   Insight/Motivation Good   Affect/Mood Display Appropriate   Cognition Alert;Oriented

## 2017-10-24 NOTE — ASSESSMENT & PLAN NOTE
- Restarted Effexor 225 mg daily on unit   - Trial of Remeron 15 mg qHS for insomnia - initiated 10/20 (continue)  - Vistaril PRN insomnia, anxiety  - Current diagnosis Adjustment Disorder per primary team, but she may have MDD and/or anxiety spectrum disorder

## 2017-10-24 NOTE — PROGRESS NOTES
Pt slept 8 hours she is calm and cooperative on the unit attends group with active participation. MVC in place will continue to monitor.

## 2017-12-10 ENCOUNTER — PATIENT MESSAGE (OUTPATIENT)
Dept: FAMILY MEDICINE | Facility: CLINIC | Age: 37
End: 2017-12-10

## 2017-12-10 DIAGNOSIS — M79.10 MYALGIA: Primary | ICD-10-CM

## 2017-12-12 RX ORDER — TIZANIDINE 4 MG/1
16 TABLET ORAL NIGHTLY PRN
Qty: 120 TABLET | Refills: 0 | Status: SHIPPED | OUTPATIENT
Start: 2017-12-12 | End: 2018-01-11

## 2018-03-16 DIAGNOSIS — I10 ESSENTIAL HYPERTENSION: ICD-10-CM

## 2018-03-16 RX ORDER — LISINOPRIL 5 MG/1
TABLET ORAL
Qty: 90 TABLET | Refills: 0 | Status: SHIPPED | OUTPATIENT
Start: 2018-03-16 | End: 2018-06-23 | Stop reason: SDUPTHER

## 2018-06-23 DIAGNOSIS — I10 ESSENTIAL HYPERTENSION: ICD-10-CM

## 2018-06-25 RX ORDER — LISINOPRIL 5 MG/1
TABLET ORAL
Qty: 90 TABLET | Refills: 3 | Status: SHIPPED | OUTPATIENT
Start: 2018-06-25 | End: 2020-03-05 | Stop reason: SDUPTHER

## 2018-11-30 ENCOUNTER — PATIENT MESSAGE (OUTPATIENT)
Dept: ADMINISTRATIVE | Facility: OTHER | Age: 38
End: 2018-11-30

## 2018-11-30 ENCOUNTER — OFFICE VISIT (OUTPATIENT)
Dept: FAMILY MEDICINE | Facility: CLINIC | Age: 38
End: 2018-11-30
Payer: COMMERCIAL

## 2018-11-30 VITALS
WEIGHT: 190.06 LBS | TEMPERATURE: 98 F | OXYGEN SATURATION: 99 % | RESPIRATION RATE: 18 BRPM | DIASTOLIC BLOOD PRESSURE: 86 MMHG | HEART RATE: 91 BPM | SYSTOLIC BLOOD PRESSURE: 118 MMHG | BODY MASS INDEX: 37.31 KG/M2 | HEIGHT: 60 IN

## 2018-11-30 DIAGNOSIS — Z00.00 ANNUAL PHYSICAL EXAM: Primary | ICD-10-CM

## 2018-11-30 DIAGNOSIS — I10 ESSENTIAL HYPERTENSION: ICD-10-CM

## 2018-11-30 DIAGNOSIS — E66.9 OBESITY (BMI 35.0-39.9 WITHOUT COMORBIDITY): ICD-10-CM

## 2018-11-30 DIAGNOSIS — M43.16 SPONDYLOLISTHESIS OF LUMBAR REGION: ICD-10-CM

## 2018-11-30 PROCEDURE — 99212 OFFICE O/P EST SF 10 MIN: CPT | Mod: 25,S$GLB,, | Performed by: FAMILY MEDICINE

## 2018-11-30 PROCEDURE — 3079F DIAST BP 80-89 MM HG: CPT | Mod: CPTII,S$GLB,, | Performed by: FAMILY MEDICINE

## 2018-11-30 PROCEDURE — 99999 PR PBB SHADOW E&M-EST. PATIENT-LVL III: CPT | Mod: PBBFAC,,, | Performed by: FAMILY MEDICINE

## 2018-11-30 PROCEDURE — 99395 PREV VISIT EST AGE 18-39: CPT | Mod: S$GLB,,, | Performed by: FAMILY MEDICINE

## 2018-11-30 PROCEDURE — 96372 THER/PROPH/DIAG INJ SC/IM: CPT | Mod: S$GLB,,, | Performed by: FAMILY MEDICINE

## 2018-11-30 PROCEDURE — 3008F BODY MASS INDEX DOCD: CPT | Mod: CPTII,S$GLB,, | Performed by: FAMILY MEDICINE

## 2018-11-30 PROCEDURE — 3074F SYST BP LT 130 MM HG: CPT | Mod: CPTII,S$GLB,, | Performed by: FAMILY MEDICINE

## 2018-11-30 RX ORDER — KETOROLAC TROMETHAMINE 30 MG/ML
30 INJECTION, SOLUTION INTRAMUSCULAR; INTRAVENOUS
Status: COMPLETED | OUTPATIENT
Start: 2018-11-30 | End: 2018-11-30

## 2018-11-30 RX ORDER — TIZANIDINE 4 MG/1
4 TABLET ORAL EVERY 6 HOURS PRN
Qty: 30 TABLET | Refills: 0 | Status: SHIPPED | OUTPATIENT
Start: 2018-11-30 | End: 2018-12-10

## 2018-11-30 RX ORDER — KETOROLAC TROMETHAMINE 10 MG/1
10 TABLET, FILM COATED ORAL 3 TIMES DAILY
Qty: 15 TABLET | Refills: 0 | Status: SHIPPED | OUTPATIENT
Start: 2018-11-30 | End: 2019-08-06

## 2018-11-30 RX ADMIN — KETOROLAC TROMETHAMINE 30 MG: 30 INJECTION, SOLUTION INTRAMUSCULAR; INTRAVENOUS at 01:11

## 2018-11-30 NOTE — PROGRESS NOTES
Chief Complaint   Patient presents with    Back Pain       SUBJECTIVE:  Rosie Izaguirre is a 37 y.o. female here for follow up of chronic pain.   Patient has recurrent pain involving the cervical spine, lumbar spine, radiates down to her right side.  Conservative treatment with tylenol, NSAIDs, alternative treatments, complementary non opioid nerve/epileptic medication has failed with primary use.  Now opioid dependent for relief.  Has tried PT/OT, injections with mixed success.  She had spondylosis and stenosis and she had MRI/Xray and it is decent most the time.    Aleve she takes and helps some.     Currently has co-morbidities including below problem list.      Social History     Tobacco Use    Smoking status: Never Smoker    Smokeless tobacco: Never Used   Substance Use Topics    Alcohol use: No    Drug use: No       Patient Active Problem List    Diagnosis Date Noted    Adjustment disorder with mixed disturbance of emotions and conduct 10/19/2017    Sedative hypnotic or anxiolytic dependence 10/19/2017    Adjustment disorder with mixed anxiety and depressed mood 10/18/2017    Suicide attempt by substance overdose 10/18/2017    Essential hypertension 02/09/2017     New over last year  No secondary affects to date  ekg 1/2017-normal  HCTZ 1/2017-too much urination 2/17 discontinued  2/2017-lisinopril 5  Start digital HTN program 1/2017      Spondylolisthesis of lumbar region 02/09/2017     Since teenage years.  She has had ARIANNA in the past that helps.  She has done PT and takes OTC medication  Flares up every now and then.      Microcytic anemia 02/09/2017     Noted in 2014      Obesity (BMI 35.0-39.9 without comorbidity) 09/26/2014    Insomnia due to mental disorder 09/26/2014    GERD (gastroesophageal reflux disease) 09/26/2014       ROS    OBJECTIVE:  /86 (BP Location: Right arm, Patient Position: Sitting, BP Method: Large (Manual))   Pulse 91   Temp 98.2 °F (36.8 °C) (Oral)   Resp 18    Ht 5' (1.524 m)   Wt 86.2 kg (190 lb 0.6 oz)   SpO2 99%   BMI 37.11 kg/m²     Wt Readings from Last 3 Encounters:   11/30/18 86.2 kg (190 lb 0.6 oz)   10/18/17 81.6 kg (180 lb)   10/18/17 81.6 kg (180 lb)     Wt Readings from Last 15 Encounters:   11/30/18 86.2 kg (190 lb 0.6 oz)   10/18/17 81.6 kg (180 lb)   10/18/17 81.6 kg (180 lb)   02/09/17 84 kg (185 lb 3 oz)   01/12/17 89 kg (196 lb 3.4 oz)   09/26/14 86 kg (189 lb 11.2 oz)       BP Readings from Last 3 Encounters:   11/30/18 118/86   10/24/17 (!) 160/84   10/18/17 128/77       Patient is in usual state of health, alert and oriented without signs of intoxication.  No evidence on exam of illegal substance use or concerning symptoms involving abuse or intoxication (specifically evidence of IVDU, unusual bruising, slurred speech, unusual explanations).  Dentition is normal for patient without acute change.  Heart and lung exam are unremarkable.  Chronic pain sites are unchanged from prior exam.  No new focal neurological deficits noted.    Functional assessment:    PEG-3 n/a    Review of old Records:    Reviewed per epic and [facility]    Review of old labs:    Lab Results   Component Value Date    TSH 2.954 10/18/2017     Lab Results   Component Value Date    WBC 7.99 10/24/2017    HGB 10.1 (L) 10/24/2017    HCT 32.0 (L) 10/24/2017    MCV 82 10/24/2017     (H) 10/24/2017       Chemistry        Component Value Date/Time     10/24/2017 0535    K 3.7 10/24/2017 0535     10/24/2017 0535    CO2 27 10/24/2017 0535    BUN 6 10/24/2017 0535    CREATININE 0.7 10/24/2017 0535    GLU 87 10/24/2017 0535        Component Value Date/Time    CALCIUM 9.0 10/24/2017 0535    ALKPHOS 110 10/24/2017 0535    AST 25 10/24/2017 0535    ALT 55 (H) 10/24/2017 0535    BILITOT 0.2 10/24/2017 0535    ESTGFRAFRICA >60.0 10/24/2017 0535    EGFRNONAA >60.0 10/24/2017 0535        Lab Results   Component Value Date    CHOL 206 (H) 10/20/2017    CHOL 222 (H) 01/12/2017    CHOL  208 (H) 10/17/2014     Lab Results   Component Value Date    HDL 56 10/20/2017    HDL 59 01/12/2017    HDL 58 10/17/2014     Lab Results   Component Value Date    LDLCALC 141.4 10/20/2017    LDLCALC 150.0 01/12/2017    LDLCALC 137.4 10/17/2014     Lab Results   Component Value Date    TRIG 43 10/20/2017    TRIG 65 01/12/2017    TRIG 63 10/17/2014     Lab Results   Component Value Date    CHOLHDL 27.2 10/20/2017    CHOLHDL 26.6 01/12/2017    CHOLHDL 27.9 10/17/2014         Review of old imaging:  Reviewed imaging in epic over last 3 months.      ASSESSMENT/plan    ICD-10-CM ICD-9-CM   1. Essential hypertension I10 401.9   2. Obesity (BMI 35.0-39.9 without comorbidity) E66.9 278.00   3. Spondylolisthesis of lumbar region M43.16 738.4           High risk medication review completed per guidelines to insure safest use of medication.    We reviewed our goals of care:    Restore function    Continue with HTN treatment.        No future appointments.       Medication List           Accurate as of 11/30/18  1:30 PM. If you have any questions, ask your nurse or doctor.               CONTINUE taking these medications    ferrous sulfate 325 mg (65 mg iron) Tab tablet  Commonly known as:  FEOSOL  Take 1 tablet (325 mg total) by mouth once daily.     folic acid-vit B6-vit B12 2.5-25-2 mg 2.5-25-2 mg Tab  Commonly known as:  FOLBIC or Equiv  Take 1 tablet by mouth once daily.     hydrOXYzine pamoate 50 MG Cap  Commonly known as:  VISTARIL  Take 1 capsule (50 mg total) by mouth nightly as needed (insomnia).     lisinopril 5 MG tablet  Commonly known as:  PRINIVIL,ZESTRIL  TAKE 1 TABLET BY MOUTH EVERY DAY     mirtazapine 15 MG tablet  Commonly known as:  REMERON  Take 1 tablet (15 mg total) by mouth every evening.     multivitamin tablet  Commonly known as:  THERAGRAN  Take 1 tablet by mouth once daily.     venlafaxine 75 MG tablet  Commonly known as:  EFFEXOR  Take 3 tablets (225 mg total) by mouth once daily.             6 weeks  or sooner as needed

## 2018-12-03 NOTE — PROGRESS NOTES
Chief Complaint   Patient presents with    Back Pain     SUBJECTIVE:   37 y.o. female for annual routine  checkup.  Current Outpatient Medications   Medication Sig Dispense Refill    ferrous sulfate 325 mg (65 mg iron) Tab tablet Take 1 tablet (325 mg total) by mouth once daily. 90 tablet 1    folic acid-vit B6-vit B12 2.5-25-2 mg (FOLBIC OR EQUIV) 2.5-25-2 mg Tab Take 1 tablet by mouth once daily. 30 tablet 0    hydrOXYzine pamoate (VISTARIL) 50 MG Cap Take 1 capsule (50 mg total) by mouth nightly as needed (insomnia). 30 capsule 0    ketorolac (TORADOL) 10 mg tablet Take 1 tablet (10 mg total) by mouth 3 (three) times daily. 15 tablet 0    lisinopril (PRINIVIL,ZESTRIL) 5 MG tablet TAKE 1 TABLET BY MOUTH EVERY DAY 90 tablet 3    mirtazapine (REMERON) 15 MG tablet Take 1 tablet (15 mg total) by mouth every evening. 30 tablet 0    multivitamin (THERAGRAN) tablet Take 1 tablet by mouth once daily. 30 tablet 0    tiZANidine (ZANAFLEX) 4 MG tablet Take 1 tablet (4 mg total) by mouth every 6 (six) hours as needed. 30 tablet 0    venlafaxine (EFFEXOR) 75 MG tablet Take 3 tablets (225 mg total) by mouth once daily. 30 tablet 0     No current facility-administered medications for this visit.      Allergies: Patient has no known allergies.   No LMP recorded.    ROS:  Feeling well. No dyspnea or chest pain on exertion.  No abdominal pain, change in bowel habits, black or bloody stools.  No urinary tract symptoms. GYN ROS: normal menses, no abnormal bleeding, pelvic pain or discharge, no breast pain or new or enlarging lumps on self exam, she complains of back pain. No neurological complaints.    OBJECTIVE:   The patient appears well, alert, oriented x 3, in no distress.  /86 (BP Location: Right arm, Patient Position: Sitting, BP Method: Large (Manual))   Pulse 91   Temp 98.2 °F (36.8 °C) (Oral)   Resp 18   Ht 5' (1.524 m)   Wt 86.2 kg (190 lb 0.6 oz)   SpO2 99%   BMI 37.11 kg/m²   ENT normal.  Neck  supple. No adenopathy or thyromegaly. SHI. Lungs are clear, good air entry, no wheezes, rhonchi or rales. S1 and S2 normal, no murmurs, regular rate and rhythm. Abdomen soft without tenderness, guarding, mass or organomegaly. Extremities show no edema, normal peripheral pulses. Neurological is normal, no focal findings.  See separate note    BREAST EXAM:     PELVIC EXAM:     ASSESSMENT:   annual    PLAN:   Counseled on age appropriate medical preventative services, including age appropriate cancer screenings, over all nutritional health, need for a consistent exercise regimen and an over all push towards maintaining a vigorous and active lifestyle.  Counseled on age appropriate vaccines and discussed upcoming health care needs based on age/gender.  Spent time with patient counseling on need for a good patient/doctor relationship moving forward.  Discussed use of common OTC medications and supplements.  Discussed common dietary aids and use of caffeine and the need for good sleep hygiene and stress management.

## 2018-12-08 ENCOUNTER — TELEPHONE (OUTPATIENT)
Dept: FAMILY MEDICINE | Facility: CLINIC | Age: 38
End: 2018-12-08

## 2018-12-08 NOTE — TELEPHONE ENCOUNTER
Called patient and left a Detailed message that annual labs are due. Patient can stop by any clinic to have labs done. LVM to callback if any other questions.

## 2018-12-08 NOTE — TELEPHONE ENCOUNTER
----- Message from Hang Mai MD sent at 12/3/2018  8:14 AM CST -----  Schedule her for annual lab work

## 2018-12-17 DIAGNOSIS — Z12.39 BREAST CANCER SCREENING: ICD-10-CM

## 2019-08-06 ENCOUNTER — LAB VISIT (OUTPATIENT)
Dept: LAB | Facility: HOSPITAL | Age: 39
End: 2019-08-06
Attending: PSYCHIATRY & NEUROLOGY
Payer: COMMERCIAL

## 2019-08-06 ENCOUNTER — OFFICE VISIT (OUTPATIENT)
Dept: OBSTETRICS AND GYNECOLOGY | Facility: CLINIC | Age: 39
End: 2019-08-06
Payer: COMMERCIAL

## 2019-08-06 VITALS — BODY MASS INDEX: 39.87 KG/M2 | HEIGHT: 60 IN | WEIGHT: 203.06 LBS

## 2019-08-06 DIAGNOSIS — F33.2 SEVERE RECURRENT MAJOR DEPRESSION WITHOUT PSYCHOTIC FEATURES: Primary | ICD-10-CM

## 2019-08-06 DIAGNOSIS — Z01.419 ENCOUNTER FOR WELL WOMAN EXAM WITH ROUTINE GYNECOLOGICAL EXAM: Primary | ICD-10-CM

## 2019-08-06 DIAGNOSIS — Z12.31 BREAST CANCER SCREENING BY MAMMOGRAM: ICD-10-CM

## 2019-08-06 DIAGNOSIS — Z12.4 ENCOUNTER FOR PAPANICOLAOU SMEAR FOR CERVICAL CANCER SCREENING: ICD-10-CM

## 2019-08-06 DIAGNOSIS — R10.84 GENERALIZED ABDOMINAL PAIN: ICD-10-CM

## 2019-08-06 LAB
ALBUMIN SERPL BCP-MCNC: 4 G/DL (ref 3.5–5.2)
ALP SERPL-CCNC: 110 U/L (ref 55–135)
ALT SERPL W/O P-5'-P-CCNC: 16 U/L (ref 10–44)
ANION GAP SERPL CALC-SCNC: 11 MMOL/L (ref 8–16)
AST SERPL-CCNC: 19 U/L (ref 10–40)
BASOPHILS # BLD AUTO: 0.02 K/UL (ref 0–0.2)
BASOPHILS NFR BLD: 0.3 % (ref 0–1.9)
BILIRUB SERPL-MCNC: 0.2 MG/DL (ref 0.1–1)
BUN SERPL-MCNC: 5 MG/DL (ref 6–20)
CALCIUM SERPL-MCNC: 9.2 MG/DL (ref 8.7–10.5)
CHLORIDE SERPL-SCNC: 105 MMOL/L (ref 95–110)
CHOLEST SERPL-MCNC: 197 MG/DL (ref 120–199)
CHOLEST/HDLC SERPL: 3.6 {RATIO} (ref 2–5)
CO2 SERPL-SCNC: 24 MMOL/L (ref 23–29)
CREAT SERPL-MCNC: 0.8 MG/DL (ref 0.5–1.4)
DIFFERENTIAL METHOD: ABNORMAL
EOSINOPHIL # BLD AUTO: 0 K/UL (ref 0–0.5)
EOSINOPHIL NFR BLD: 0.3 % (ref 0–8)
ERYTHROCYTE [DISTWIDTH] IN BLOOD BY AUTOMATED COUNT: 15.9 % (ref 11.5–14.5)
EST. GFR  (AFRICAN AMERICAN): >60 ML/MIN/1.73 M^2
EST. GFR  (NON AFRICAN AMERICAN): >60 ML/MIN/1.73 M^2
GLUCOSE SERPL-MCNC: 87 MG/DL (ref 70–110)
HCT VFR BLD AUTO: 34.6 % (ref 37–48.5)
HDLC SERPL-MCNC: 54 MG/DL (ref 40–75)
HDLC SERPL: 27.4 % (ref 20–50)
HGB BLD-MCNC: 10.8 G/DL (ref 12–16)
LDLC SERPL CALC-MCNC: 132.4 MG/DL (ref 63–159)
LYMPHOCYTES # BLD AUTO: 1.8 K/UL (ref 1–4.8)
LYMPHOCYTES NFR BLD: 23.8 % (ref 18–48)
MCH RBC QN AUTO: 24.7 PG (ref 27–31)
MCHC RBC AUTO-ENTMCNC: 31.2 G/DL (ref 32–36)
MCV RBC AUTO: 79 FL (ref 82–98)
MONOCYTES # BLD AUTO: 0.4 K/UL (ref 0.3–1)
MONOCYTES NFR BLD: 5 % (ref 4–15)
NEUTROPHILS # BLD AUTO: 5.3 K/UL (ref 1.8–7.7)
NEUTROPHILS NFR BLD: 70.7 % (ref 38–73)
NONHDLC SERPL-MCNC: 143 MG/DL
PLATELET # BLD AUTO: 457 K/UL (ref 150–350)
PMV BLD AUTO: 10.3 FL (ref 9.2–12.9)
POTASSIUM SERPL-SCNC: 3.9 MMOL/L (ref 3.5–5.1)
PROT SERPL-MCNC: 7.7 G/DL (ref 6–8.4)
RBC # BLD AUTO: 4.38 M/UL (ref 4–5.4)
SODIUM SERPL-SCNC: 140 MMOL/L (ref 136–145)
TRIGL SERPL-MCNC: 53 MG/DL (ref 30–150)
TSH SERPL DL<=0.005 MIU/L-ACNC: 2.15 UIU/ML (ref 0.4–4)
WBC # BLD AUTO: 7.56 K/UL (ref 3.9–12.7)

## 2019-08-06 PROCEDURE — 36415 COLL VENOUS BLD VENIPUNCTURE: CPT

## 2019-08-06 PROCEDURE — 99999 PR PBB SHADOW E&M-EST. PATIENT-LVL III: CPT | Mod: PBBFAC,,, | Performed by: OBSTETRICS & GYNECOLOGY

## 2019-08-06 PROCEDURE — 87624 HPV HI-RISK TYP POOLED RSLT: CPT

## 2019-08-06 PROCEDURE — 83036 HEMOGLOBIN GLYCOSYLATED A1C: CPT

## 2019-08-06 PROCEDURE — 99386 PREV VISIT NEW AGE 40-64: CPT | Mod: S$GLB,,, | Performed by: OBSTETRICS & GYNECOLOGY

## 2019-08-06 PROCEDURE — 82306 VITAMIN D 25 HYDROXY: CPT

## 2019-08-06 PROCEDURE — 99386 PR PREVENTIVE VISIT,NEW,40-64: ICD-10-PCS | Mod: S$GLB,,, | Performed by: OBSTETRICS & GYNECOLOGY

## 2019-08-06 PROCEDURE — 88175 CYTOPATH C/V AUTO FLUID REDO: CPT

## 2019-08-06 PROCEDURE — 85025 COMPLETE CBC W/AUTO DIFF WBC: CPT

## 2019-08-06 PROCEDURE — 99999 PR PBB SHADOW E&M-EST. PATIENT-LVL III: ICD-10-PCS | Mod: PBBFAC,,, | Performed by: OBSTETRICS & GYNECOLOGY

## 2019-08-06 PROCEDURE — 80061 LIPID PANEL: CPT

## 2019-08-06 PROCEDURE — 84443 ASSAY THYROID STIM HORMONE: CPT

## 2019-08-06 PROCEDURE — 80053 COMPREHEN METABOLIC PANEL: CPT

## 2019-08-06 RX ORDER — DEXTROAMPHETAMINE SACCHARATE, AMPHETAMINE ASPARTATE, DEXTROAMPHETAMINE SULFATE AND AMPHETAMINE SULFATE 5; 5; 5; 5 MG/1; MG/1; MG/1; MG/1
TABLET ORAL
COMMUNITY
Start: 2018-10-01

## 2019-08-06 RX ORDER — ZOLPIDEM TARTRATE 10 MG/1
TABLET ORAL
Refills: 3 | COMMUNITY
Start: 2019-08-02

## 2019-08-06 NOTE — PROGRESS NOTES
SUBJECTIVE:   Rosie Izaguirre is a 38 y.o. female No obstetric history on file.  for annual well woman exam. Patient's last menstrual period was 07/09/2019 (exact date)..    She c/o worsening lower abdominal pain x 6 months.  Pain is constant and on b/l lower abdomen, dull and achy.  No radiation  Pain is around 3/10 but worst at 6  Pain relief with alleve sometimes  Sex makes it worse.  Denies dysmenorrhea, but stated period are usually heavy all her life.    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    Contraception: none    Past Medical History:   Diagnosis Date    Hx of psychiatric care     Hypertension     Psychiatric problem     Therapy      Past Surgical History:   Procedure Laterality Date    APPENDECTOMY       Social History     Socioeconomic History    Marital status:      Spouse name: Not on file    Number of children: 2    Years of education: Not on file    Highest education level: Not on file   Occupational History    Not on file   Social Needs    Financial resource strain: Not on file    Food insecurity:     Worry: Not on file     Inability: Not on file    Transportation needs:     Medical: Not on file     Non-medical: Not on file   Tobacco Use    Smoking status: Never Smoker    Smokeless tobacco: Never Used   Substance and Sexual Activity    Alcohol use: No    Drug use: No    Sexual activity: Yes     Partners: Male     Birth control/protection: None   Lifestyle    Physical activity:     Days per week: Not on file     Minutes per session: Not on file    Stress: Not on file   Relationships    Social connections:     Talks on phone: Not on file     Gets together: Not on file     Attends Hindu service: Not on file     Active member of club or organization: Not on file     Attends meetings of clubs or organizations: Not on file     Relationship status: Not on file   Other Topics Concern    Patient feels they ought to cut down on drinking/drug use Not Asked     Patient annoyed by others criticizing their drinking/drug use Not Asked    Patient has felt bad or guilty about drinking/drug use Not Asked    Patient has had a drink/used drugs as an eye opener in the AM Not Asked   Social History Narrative    Not on file     Family History   Problem Relation Age of Onset    Hypertension Father     Anxiety disorder Daughter     Asperger's syndrome Daughter      OB History    Para Term  AB Living   2         2   SAB TAB Ectopic Multiple Live Births           2      # Outcome Date GA Lbr Danis/2nd Weight Sex Delivery Anes PTL Lv   2             1                Obstetric Comments    x 2   Gynhx:  Reg/4-5. Mild cramp, normal to heavy flow   No contraception   H/o abnl pap in , s/p colpo, normal pap since then         Current Outpatient Medications   Medication Sig Dispense Refill    dextroamphetamine-amphetamine (ADDERALL) 20 mg tablet       folic acid-vit B6-vit B12 2.5-25-2 mg (FOLBIC OR EQUIV) 2.5-25-2 mg Tab Take 1 tablet by mouth once daily. 30 tablet 0    lisinopril (PRINIVIL,ZESTRIL) 5 MG tablet TAKE 1 TABLET BY MOUTH EVERY DAY 90 tablet 3    zolpidem (AMBIEN) 10 mg Tab TAKE 1 TABLET(S) BY MOUTH EVERY NIGHT AT BEDTIME  3    venlafaxine (EFFEXOR) 75 MG tablet Take 3 tablets (225 mg total) by mouth once daily. 30 tablet 0     No current facility-administered medications for this visit.      Allergies: Patient has no known allergies.       ROS:  GENERAL: Denies weight gain or weight loss. Feeling well overall.   SKIN: Denies rash or lesions.   HEAD: Denies head injury or headache.   NODES: Denies enlarged lymph nodes.   CHEST: Denies chest pain or shortness of breath.   CARDIOVASCULAR: Denies palpitations or left sided chest pain.   ABDOMEN: No abdominal pain, constipation, diarrhea, nausea, vomiting or rectal bleeding.   URINARY: No frequency, dysuria, hematuria, or burning on urination.  REPRODUCTIVE: Denies vaginal discharge, abnormal  vaginal bleeding, lesions, pelvic pain  BREASTS: The patient performs breast self-examination and denies pain, lumps, or nipple discharge.   HEMATOLOGIC: No easy bruisability or excessive bleeding.  MUSCULOSKELETAL: Denies joint pain or swelling.   NEUROLOGIC: Denies syncope or weakness.   PSYCHIATRIC: Denies depression, anxiety or mood swings.      OBJECTIVE:   Ht 5' (1.524 m)   Wt 92.1 kg (203 lb 0.7 oz)   LMP 07/09/2019 (Exact Date)   BMI 39.65 kg/m²   The patient appears well, alert, oriented x 3, in no distress.  PSYCH:  Normal, full range of affect  NECK: negative, no thyromegaly, trachea midline  SKIN: normal, good color, good turgor and no acne, striae, hirsutism  BREAST EXAM: breasts appear normal, no suspicious masses, no skin or nipple changes or axillary nodes  ABDOMEN: soft, non-tender; bowel sounds normal; no masses,  no organomegaly, pain not elicited on exam. and no hernias, masses, or hepatosplenomegaly  GENITALIA: normal external genitalia, no erythema, no discharge  BLADDER: soft  URETHRA: normal appearing urethra with no masses, tenderness or lesions and normal urethra, normal urethral meatus  VAGINA: Normal  CERVIX: no lesions or cervical motion tenderness  UTERUS: normal size, contour, position, consistency, mobility, non-tender  ADNEXA: no mass, fullness, tenderness      ASSESSMENT:   1. Health maintenance  -pap done. Discussed ASCCP guideline screening every 3 - 5 years.   -screening MMG ordered  -counseled on exercise and healthy diet, weight loss  -bone health:  Discussed Vitamin D and Calcium supplementation, weight bearing exercises  2.  Discussed safety at home/school/work, healthy and balanced diet, sleep hygiene, as well as violence/weapons exposure.   3.  Declined contraception  4.  Lower abdominal pain: check pelvic US. Likely Musculoskeletal

## 2019-08-07 LAB
25(OH)D3+25(OH)D2 SERPL-MCNC: 25 NG/ML (ref 30–96)
ESTIMATED AVG GLUCOSE: 105 MG/DL (ref 68–131)
HBA1C MFR BLD HPLC: 5.3 % (ref 4–5.6)

## 2019-08-13 LAB
HPV HR 12 DNA CVX QL NAA+PROBE: NEGATIVE
HPV16 AG SPEC QL: NEGATIVE
HPV18 DNA SPEC QL NAA+PROBE: NEGATIVE

## 2019-08-23 ENCOUNTER — PATIENT MESSAGE (OUTPATIENT)
Dept: ADMINISTRATIVE | Facility: HOSPITAL | Age: 39
End: 2019-08-23

## 2019-08-23 ENCOUNTER — PATIENT OUTREACH (OUTPATIENT)
Dept: ADMINISTRATIVE | Facility: HOSPITAL | Age: 39
End: 2019-08-23

## 2019-09-19 ENCOUNTER — PATIENT MESSAGE (OUTPATIENT)
Dept: FAMILY MEDICINE | Facility: CLINIC | Age: 39
End: 2019-09-19

## 2019-09-19 RX ORDER — PROMETHAZINE HYDROCHLORIDE 25 MG/1
25 TABLET ORAL EVERY 4 HOURS
COMMUNITY
End: 2019-09-19 | Stop reason: SDUPTHER

## 2019-09-19 NOTE — TELEPHONE ENCOUNTER
Last Office Visit Info:   The patient's last visit with Hang Mai MD was on 11/30/2018.      Last CBC Results:   Lab Results   Component Value Date    WBC 7.56 08/06/2019    HGB 10.8 (L) 08/06/2019    HCT 34.6 (L) 08/06/2019     (H) 08/06/2019       Last CMP Results  Lab Results   Component Value Date     08/06/2019    K 3.9 08/06/2019     08/06/2019    CO2 24 08/06/2019    BUN 5 (L) 08/06/2019    CREATININE 0.8 08/06/2019    CALCIUM 9.2 08/06/2019    ALBUMIN 4.0 08/06/2019    AST 19 08/06/2019    ALT 16 08/06/2019       Last Lipids  Lab Results   Component Value Date    CHOL 197 08/06/2019    TRIG 53 08/06/2019    HDL 54 08/06/2019    LDLCALC 132.4 08/06/2019       Last A1C  Lab Results   Component Value Date    HGBA1C 5.3 08/06/2019       Last TSH  Lab Results   Component Value Date    TSH 2.149 08/06/2019       Last PSA  No results found for: PSA

## 2019-09-20 RX ORDER — PROMETHAZINE HYDROCHLORIDE 25 MG/1
25 TABLET ORAL EVERY 4 HOURS
Qty: 30 TABLET | Refills: 0 | Status: SHIPPED | OUTPATIENT
Start: 2019-09-20 | End: 2020-03-05

## 2020-02-21 DIAGNOSIS — Z12.39 BREAST CANCER SCREENING: ICD-10-CM

## 2020-03-04 ENCOUNTER — PATIENT OUTREACH (OUTPATIENT)
Dept: ADMINISTRATIVE | Facility: HOSPITAL | Age: 40
End: 2020-03-04

## 2020-03-05 ENCOUNTER — LAB VISIT (OUTPATIENT)
Dept: LAB | Facility: HOSPITAL | Age: 40
End: 2020-03-05
Attending: FAMILY MEDICINE
Payer: COMMERCIAL

## 2020-03-05 ENCOUNTER — OFFICE VISIT (OUTPATIENT)
Dept: FAMILY MEDICINE | Facility: CLINIC | Age: 40
End: 2020-03-05
Payer: COMMERCIAL

## 2020-03-05 VITALS
HEIGHT: 60 IN | TEMPERATURE: 97 F | SYSTOLIC BLOOD PRESSURE: 126 MMHG | OXYGEN SATURATION: 98 % | HEART RATE: 97 BPM | DIASTOLIC BLOOD PRESSURE: 88 MMHG | WEIGHT: 198.44 LBS | BODY MASS INDEX: 38.96 KG/M2

## 2020-03-05 DIAGNOSIS — R07.89 ATYPICAL CHEST PAIN: Primary | ICD-10-CM

## 2020-03-05 DIAGNOSIS — I10 ESSENTIAL HYPERTENSION: ICD-10-CM

## 2020-03-05 DIAGNOSIS — Z00.00 ANNUAL PHYSICAL EXAM: ICD-10-CM

## 2020-03-05 DIAGNOSIS — R07.89 ATYPICAL CHEST PAIN: ICD-10-CM

## 2020-03-05 LAB
BACTERIA #/AREA URNS HPF: ABNORMAL /HPF
BILIRUB UR QL STRIP: NEGATIVE
BILIRUB UR QL STRIP: NEGATIVE
CLARITY UR: CLEAR
CLARITY UR: CLEAR
COLOR UR: ABNORMAL
COLOR UR: ABNORMAL
GLUCOSE UR QL STRIP: NEGATIVE
GLUCOSE UR QL STRIP: NEGATIVE
HGB UR QL STRIP: ABNORMAL
HGB UR QL STRIP: ABNORMAL
KETONES UR QL STRIP: NEGATIVE
KETONES UR QL STRIP: NEGATIVE
LEUKOCYTE ESTERASE UR QL STRIP: NEGATIVE
LEUKOCYTE ESTERASE UR QL STRIP: NEGATIVE
MICROSCOPIC COMMENT: ABNORMAL
NITRITE UR QL STRIP: NEGATIVE
NITRITE UR QL STRIP: NEGATIVE
PH UR STRIP: 8 [PH] (ref 5–8)
PH UR STRIP: 8 [PH] (ref 5–8)
PROT UR QL STRIP: NEGATIVE
PROT UR QL STRIP: NEGATIVE
RBC #/AREA URNS HPF: 0 /HPF (ref 0–4)
SP GR UR STRIP: 1 (ref 1–1.03)
SP GR UR STRIP: 1 (ref 1–1.03)
SQUAMOUS #/AREA URNS HPF: 5 /HPF
URN SPEC COLLECT METH UR: ABNORMAL
URN SPEC COLLECT METH UR: ABNORMAL
UROBILINOGEN UR STRIP-ACNC: NEGATIVE EU/DL
UROBILINOGEN UR STRIP-ACNC: NEGATIVE EU/DL
WBC #/AREA URNS HPF: 2 /HPF (ref 0–5)

## 2020-03-05 PROCEDURE — 93010 EKG 12-LEAD: ICD-10-PCS | Mod: S$GLB,,, | Performed by: INTERNAL MEDICINE

## 2020-03-05 PROCEDURE — 99999 PR PBB SHADOW E&M-EST. PATIENT-LVL III: ICD-10-PCS | Mod: PBBFAC,,, | Performed by: FAMILY MEDICINE

## 2020-03-05 PROCEDURE — 3079F PR MOST RECENT DIASTOLIC BLOOD PRESSURE 80-89 MM HG: ICD-10-PCS | Mod: CPTII,S$GLB,, | Performed by: FAMILY MEDICINE

## 2020-03-05 PROCEDURE — 3074F SYST BP LT 130 MM HG: CPT | Mod: CPTII,S$GLB,, | Performed by: FAMILY MEDICINE

## 2020-03-05 PROCEDURE — 99214 PR OFFICE/OUTPT VISIT, EST, LEVL IV, 30-39 MIN: ICD-10-PCS | Mod: S$GLB,,, | Performed by: FAMILY MEDICINE

## 2020-03-05 PROCEDURE — 3008F PR BODY MASS INDEX (BMI) DOCUMENTED: ICD-10-PCS | Mod: CPTII,S$GLB,, | Performed by: FAMILY MEDICINE

## 2020-03-05 PROCEDURE — 99999 PR PBB SHADOW E&M-EST. PATIENT-LVL III: CPT | Mod: PBBFAC,,, | Performed by: FAMILY MEDICINE

## 2020-03-05 PROCEDURE — 99214 OFFICE O/P EST MOD 30 MIN: CPT | Mod: S$GLB,,, | Performed by: FAMILY MEDICINE

## 2020-03-05 PROCEDURE — 3008F BODY MASS INDEX DOCD: CPT | Mod: CPTII,S$GLB,, | Performed by: FAMILY MEDICINE

## 2020-03-05 PROCEDURE — 93005 ELECTROCARDIOGRAM TRACING: CPT | Mod: S$GLB,,, | Performed by: FAMILY MEDICINE

## 2020-03-05 PROCEDURE — 93010 ELECTROCARDIOGRAM REPORT: CPT | Mod: S$GLB,,, | Performed by: INTERNAL MEDICINE

## 2020-03-05 PROCEDURE — 93005 EKG 12-LEAD: ICD-10-PCS | Mod: S$GLB,,, | Performed by: FAMILY MEDICINE

## 2020-03-05 PROCEDURE — 81000 URINALYSIS NONAUTO W/SCOPE: CPT

## 2020-03-05 PROCEDURE — 3079F DIAST BP 80-89 MM HG: CPT | Mod: CPTII,S$GLB,, | Performed by: FAMILY MEDICINE

## 2020-03-05 PROCEDURE — 3074F PR MOST RECENT SYSTOLIC BLOOD PRESSURE < 130 MM HG: ICD-10-PCS | Mod: CPTII,S$GLB,, | Performed by: FAMILY MEDICINE

## 2020-03-05 RX ORDER — LISINOPRIL 5 MG/1
5 TABLET ORAL DAILY
Qty: 90 TABLET | Refills: 3 | Status: SHIPPED | OUTPATIENT
Start: 2020-03-05

## 2020-03-05 RX ORDER — VENLAFAXINE HYDROCHLORIDE 75 MG/1
CAPSULE, EXTENDED RELEASE ORAL
COMMUNITY
Start: 2020-02-21

## 2020-03-05 NOTE — PROGRESS NOTES
Chief Complaint   Patient presents with    Hypertension       SUBJECTIVE:  Rosie Izaguirre is a 39 y.o. female here for new problem of atypical chest pain and just not feeling well with HTN The patient is taking hypertensive medications compliantly without side effects.  Denies chest pain, dyspnea, edema, or TIA's.  .  Currently has co-morbidities including per problem list.      Past Medical History:   Diagnosis Date    Hx of psychiatric care     Hypertension     Psychiatric problem     Therapy      Past Surgical History:   Procedure Laterality Date    APPENDECTOMY       Social History     Socioeconomic History    Marital status:      Spouse name: Not on file    Number of children: 2    Years of education: Not on file    Highest education level: Not on file   Occupational History    Not on file   Social Needs    Financial resource strain: Not on file    Food insecurity:     Worry: Not on file     Inability: Not on file    Transportation needs:     Medical: No     Non-medical: No   Tobacco Use    Smoking status: Never Smoker    Smokeless tobacco: Never Used   Substance and Sexual Activity    Alcohol use: No     Frequency: Monthly or less     Drinks per session: 1 or 2     Binge frequency: Never    Drug use: No    Sexual activity: Yes     Partners: Male     Birth control/protection: None   Lifestyle    Physical activity:     Days per week: 0 days     Minutes per session: Not on file    Stress: To some extent   Relationships    Social connections:     Talks on phone: More than three times a week     Gets together: Twice a week     Attends Orthodoxy service: Not on file     Active member of club or organization: No     Attends meetings of clubs or organizations: Not on file     Relationship status:    Other Topics Concern    Patient feels they ought to cut down on drinking/drug use Not Asked    Patient annoyed by others criticizing their drinking/drug use Not Asked    Patient has  felt bad or guilty about drinking/drug use Not Asked    Patient has had a drink/used drugs as an eye opener in the AM Not Asked   Social History Narrative    Not on file     Family History   Problem Relation Age of Onset    Hypertension Father     Anxiety disorder Daughter     Asperger's syndrome Daughter      Current Outpatient Medications on File Prior to Visit   Medication Sig Dispense Refill    dextroamphetamine-amphetamine (ADDERALL) 20 mg tablet       folic acid-vit B6-vit B12 2.5-25-2 mg (FOLBIC OR EQUIV) 2.5-25-2 mg Tab Take 1 tablet by mouth once daily. 30 tablet 0    venlafaxine (EFFEXOR-XR) 75 MG 24 hr capsule       zolpidem (AMBIEN) 10 mg Tab TAKE 1 TABLET(S) BY MOUTH EVERY NIGHT AT BEDTIME  3     No current facility-administered medications on file prior to visit.      Review of patient's allergies indicates:  No Known Allergies      Review of Systems   Constitutional: Positive for malaise/fatigue.   HENT: Positive for nosebleeds.    Eyes: Negative for blurred vision.   Respiratory: Negative for shortness of breath.    Cardiovascular: Positive for chest pain and palpitations. Negative for orthopnea and PND.   Musculoskeletal: Negative for neck pain.   Neurological: Positive for headaches.       OBJECTIVE:  /88   Pulse 97   Temp 97.4 °F (36.3 °C) (Oral)   Ht 5' (1.524 m)   Wt 90 kg (198 lb 6.6 oz)   LMP 02/28/2020   SpO2 98%   BMI 38.75 kg/m²     Wt Readings from Last 3 Encounters:   03/05/20 90 kg (198 lb 6.6 oz)   08/06/19 92.1 kg (203 lb 0.7 oz)   11/30/18 86.2 kg (190 lb 0.6 oz)     BP Readings from Last 3 Encounters:   03/05/20 126/88   11/30/18 118/86   10/24/17 (!) 160/84       She appears well, in no apparent distress.  Alert and oriented times three, pleasant and cooperative. Vital signs are as documented in vital signs section.  S1 and S2 normal, no murmurs, clicks, gallops or rubs. Regular rate and rhythm. Chest is clear; no wheezes or rales. No edema or JVD.  Nose with  mild coryza    Review of old Records:  Reviewed per [facility]    Review of old labs:      Review of old imaging:      ASSESSMENT:  Problem List Items Addressed This Visit     Essential hypertension    Relevant Medications    lisinopril (PRINIVIL,ZESTRIL) 5 MG tablet      Other Visit Diagnoses     Atypical chest pain    -  Primary    Relevant Orders    IN OFFICE EKG 12-LEAD (to Muse)    CBC auto differential (Completed)    Comprehensive metabolic panel (Completed)    Urinalysis (Completed)          ICD-10-CM ICD-9-CM   1. Atypical chest pain R07.89 786.59   2. Essential hypertension I10 401.9         PLAN:  Problem List Items Addressed This Visit     Essential hypertension    Overview     New over last year  No secondary affects to date  ekg 1/2017-normal  HCTZ 1/2017-too much urination 2/17 discontinued  2/2017-lisinopril 5  Start digital HTN program 1/2017         Relevant Medications    lisinopril (PRINIVIL,ZESTRIL) 5 MG tablet      Other Visit Diagnoses     Atypical chest pain    -  Primary    Relevant Orders    IN OFFICE EKG 12-LEAD (to Muse)    CBC auto differential (Completed)    Comprehensive metabolic panel (Completed)    Urinalysis (Completed)        ekg with artifact but likely notrmal  Continue to monitor  Get new lab work to see if anemia is present.  F/u with cardiology.  Unlikely ACS  But r/o atypical presentation with cards.  Medication List with Changes/Refills   Current Medications    DEXTROAMPHETAMINE-AMPHETAMINE (ADDERALL) 20 MG TABLET        FOLIC ACID-VIT B6-VIT B12 2.5-25-2 MG (FOLBIC OR EQUIV) 2.5-25-2 MG TAB    Take 1 tablet by mouth once daily.    VENLAFAXINE (EFFEXOR) 75 MG TABLET    Take 3 tablets (225 mg total) by mouth once daily.    VENLAFAXINE (EFFEXOR-XR) 75 MG 24 HR CAPSULE        ZOLPIDEM (AMBIEN) 10 MG TAB    TAKE 1 TABLET(S) BY MOUTH EVERY NIGHT AT BEDTIME   Changed and/or Refilled Medications    Modified Medication Previous Medication    LISINOPRIL (PRINIVIL,ZESTRIL) 5 MG TABLET  lisinopril (PRINIVIL,ZESTRIL) 5 MG tablet       Take 1 tablet (5 mg total) by mouth once daily.    TAKE 1 TABLET BY MOUTH EVERY DAY   Discontinued Medications    PROMETHAZINE (PHENERGAN) 25 MG TABLET    Take 1 tablet (25 mg total) by mouth every 4 (four) hours.         No follow-ups on file.  Answers for HPI/ROS submitted by the patient on 3/5/2020   Hypertension  Chronicity: chronic  Onset: more than 1 year ago  Progression since onset: rapidly worsening  Condition status: uncontrolled  anxiety: No  peripheral edema: No  sweats: Yes  Agents associated with hypertension: no associated agents  CAD risks: family history, obesity, stress  Compliance problems: no compliance problems

## 2020-03-16 ENCOUNTER — PATIENT MESSAGE (OUTPATIENT)
Dept: FAMILY MEDICINE | Facility: CLINIC | Age: 40
End: 2020-03-16

## 2020-08-14 DIAGNOSIS — Z11.59 NEED FOR HEPATITIS C SCREENING TEST: ICD-10-CM

## 2020-10-05 ENCOUNTER — PATIENT MESSAGE (OUTPATIENT)
Dept: ADMINISTRATIVE | Facility: HOSPITAL | Age: 40
End: 2020-10-05

## 2021-01-05 ENCOUNTER — PATIENT MESSAGE (OUTPATIENT)
Dept: ADMINISTRATIVE | Facility: HOSPITAL | Age: 41
End: 2021-01-05

## 2021-03-27 DIAGNOSIS — I10 ESSENTIAL HYPERTENSION: ICD-10-CM

## 2021-03-27 RX ORDER — LISINOPRIL 5 MG/1
TABLET ORAL
Qty: 90 TABLET | Refills: 3 | OUTPATIENT
Start: 2021-03-27

## 2021-03-29 DIAGNOSIS — I10 ESSENTIAL HYPERTENSION: ICD-10-CM

## 2021-03-29 RX ORDER — LISINOPRIL 5 MG/1
TABLET ORAL
Qty: 90 TABLET | Refills: 3 | OUTPATIENT
Start: 2021-03-29

## 2021-04-06 ENCOUNTER — PATIENT MESSAGE (OUTPATIENT)
Dept: ADMINISTRATIVE | Facility: HOSPITAL | Age: 41
End: 2021-04-06

## 2021-04-21 DIAGNOSIS — Z12.31 OTHER SCREENING MAMMOGRAM: ICD-10-CM

## 2021-07-06 ENCOUNTER — PATIENT MESSAGE (OUTPATIENT)
Dept: ADMINISTRATIVE | Facility: HOSPITAL | Age: 41
End: 2021-07-06

## 2021-07-07 ENCOUNTER — PATIENT MESSAGE (OUTPATIENT)
Dept: ADMINISTRATIVE | Facility: HOSPITAL | Age: 41
End: 2021-07-07

## 2021-10-04 ENCOUNTER — PATIENT MESSAGE (OUTPATIENT)
Dept: ADMINISTRATIVE | Facility: HOSPITAL | Age: 41
End: 2021-10-04

## 2021-12-08 ENCOUNTER — PATIENT MESSAGE (OUTPATIENT)
Dept: ADMINISTRATIVE | Facility: HOSPITAL | Age: 41
End: 2021-12-08
Payer: COMMERCIAL

## 2021-12-29 ENCOUNTER — TELEPHONE (OUTPATIENT)
Dept: FAMILY MEDICINE | Facility: CLINIC | Age: 41
End: 2021-12-29
Payer: COMMERCIAL

## 2022-05-30 ENCOUNTER — PATIENT MESSAGE (OUTPATIENT)
Dept: ADMINISTRATIVE | Facility: HOSPITAL | Age: 42
End: 2022-05-30
Payer: COMMERCIAL

## 2022-09-12 DIAGNOSIS — Z00.00 ANNUAL PHYSICAL EXAM: Primary | ICD-10-CM

## 2024-04-15 ENCOUNTER — HOSPITAL ENCOUNTER (EMERGENCY)
Facility: HOSPITAL | Age: 44
Discharge: HOME | End: 2024-04-15
Attending: EMERGENCY MEDICINE
Payer: COMMERCIAL

## 2024-04-15 VITALS
RESPIRATION RATE: 15 BRPM | SYSTOLIC BLOOD PRESSURE: 153 MMHG | DIASTOLIC BLOOD PRESSURE: 72 MMHG | OXYGEN SATURATION: 97 % | HEART RATE: 84 BPM | WEIGHT: 200 LBS | HEIGHT: 60 IN | TEMPERATURE: 98 F | BODY MASS INDEX: 39.27 KG/M2

## 2024-04-15 DIAGNOSIS — R51.9 ACUTE NONINTRACTABLE HEADACHE, UNSPECIFIED HEADACHE TYPE: Primary | ICD-10-CM

## 2024-04-15 LAB
ALBUMIN SERPL BCP-MCNC: 3.6 G/DL (ref 3.5–5.2)
ALLENS TEST: ABNORMAL
ALP SERPL-CCNC: 107 U/L (ref 55–135)
ALT SERPL W/O P-5'-P-CCNC: 18 U/L (ref 10–44)
ANION GAP SERPL CALC-SCNC: 14 MMOL/L (ref 8–16)
ANION GAP SERPL CALC-SCNC: 9 MMOL/L (ref 8–16)
AST SERPL-CCNC: 20 U/L (ref 10–40)
BASOPHILS # BLD AUTO: 0.04 K/UL (ref 0–0.2)
BASOPHILS NFR BLD: 0.4 % (ref 0–1.9)
BILIRUB SERPL-MCNC: 0.2 MG/DL (ref 0.1–1)
BUN SERPL-MCNC: 3 MG/DL (ref 6–30)
BUN SERPL-MCNC: 6 MG/DL (ref 6–20)
CALCIUM SERPL-MCNC: 9.3 MG/DL (ref 8.7–10.5)
CHLORIDE SERPL-SCNC: 103 MMOL/L (ref 95–110)
CHLORIDE SERPL-SCNC: 105 MMOL/L (ref 95–110)
CO2 SERPL-SCNC: 25 MMOL/L (ref 23–29)
CREAT SERPL-MCNC: 0.7 MG/DL (ref 0.5–1.4)
CREAT SERPL-MCNC: 0.8 MG/DL (ref 0.5–1.4)
DIFFERENTIAL METHOD BLD: ABNORMAL
EOSINOPHIL # BLD AUTO: 0 K/UL (ref 0–0.5)
EOSINOPHIL NFR BLD: 0.4 % (ref 0–8)
ERYTHROCYTE [DISTWIDTH] IN BLOOD BY AUTOMATED COUNT: 14.8 % (ref 11.5–14.5)
EST. GFR  (NO RACE VARIABLE): >60 ML/MIN/1.73 M^2
GLUCOSE SERPL-MCNC: 89 MG/DL (ref 70–110)
GLUCOSE SERPL-MCNC: 92 MG/DL (ref 70–110)
HCT VFR BLD AUTO: 33.6 % (ref 37–48.5)
HCT VFR BLD CALC: 34 %PCV (ref 36–54)
HGB BLD-MCNC: 10.7 G/DL (ref 12–16)
IMM GRANULOCYTES # BLD AUTO: 0.04 K/UL (ref 0–0.04)
IMM GRANULOCYTES NFR BLD AUTO: 0.4 % (ref 0–0.5)
LYMPHOCYTES # BLD AUTO: 2.6 K/UL (ref 1–4.8)
LYMPHOCYTES NFR BLD: 24.9 % (ref 18–48)
MCH RBC QN AUTO: 24.8 PG (ref 27–31)
MCHC RBC AUTO-ENTMCNC: 31.8 G/DL (ref 32–36)
MCV RBC AUTO: 78 FL (ref 82–98)
MONOCYTES # BLD AUTO: 0.6 K/UL (ref 0.3–1)
MONOCYTES NFR BLD: 6 % (ref 4–15)
NEUTROPHILS # BLD AUTO: 7.2 K/UL (ref 1.8–7.7)
NEUTROPHILS NFR BLD: 67.9 % (ref 38–73)
NRBC BLD-RTO: 0 /100 WBC
PLATELET # BLD AUTO: 437 K/UL (ref 150–450)
PMV BLD AUTO: 10.7 FL (ref 9.2–12.9)
POC IONIZED CALCIUM: 1.22 MMOL/L (ref 1.06–1.42)
POC TCO2 (MEASURED): 26 MMOL/L (ref 23–29)
POTASSIUM BLD-SCNC: 3.4 MMOL/L (ref 3.5–5.1)
POTASSIUM SERPL-SCNC: 3.5 MMOL/L (ref 3.5–5.1)
PROT SERPL-MCNC: 7.6 G/DL (ref 6–8.4)
RBC # BLD AUTO: 4.32 M/UL (ref 4–5.4)
SAMPLE: ABNORMAL
SITE: ABNORMAL
SODIUM BLD-SCNC: 139 MMOL/L (ref 136–145)
SODIUM SERPL-SCNC: 139 MMOL/L (ref 136–145)
WBC # BLD AUTO: 10.58 K/UL (ref 3.9–12.7)

## 2024-04-15 PROCEDURE — 82565 ASSAY OF CREATININE: CPT

## 2024-04-15 PROCEDURE — 25500020 PHARM REV CODE 255: Performed by: EMERGENCY MEDICINE

## 2024-04-15 PROCEDURE — 96361 HYDRATE IV INFUSION ADD-ON: CPT

## 2024-04-15 PROCEDURE — 82330 ASSAY OF CALCIUM: CPT

## 2024-04-15 PROCEDURE — 99285 EMERGENCY DEPT VISIT HI MDM: CPT | Mod: 25

## 2024-04-15 PROCEDURE — 84295 ASSAY OF SERUM SODIUM: CPT | Mod: 91

## 2024-04-15 PROCEDURE — 82565 ASSAY OF CREATININE: CPT | Mod: 59,91

## 2024-04-15 PROCEDURE — 99900035 HC TECH TIME PER 15 MIN (STAT)

## 2024-04-15 PROCEDURE — 85025 COMPLETE CBC W/AUTO DIFF WBC: CPT | Performed by: EMERGENCY MEDICINE

## 2024-04-15 PROCEDURE — 85014 HEMATOCRIT: CPT

## 2024-04-15 PROCEDURE — 80053 COMPREHEN METABOLIC PANEL: CPT | Performed by: EMERGENCY MEDICINE

## 2024-04-15 PROCEDURE — 84132 ASSAY OF SERUM POTASSIUM: CPT

## 2024-04-15 PROCEDURE — 25000003 PHARM REV CODE 250: Performed by: EMERGENCY MEDICINE

## 2024-04-15 PROCEDURE — 96360 HYDRATION IV INFUSION INIT: CPT

## 2024-04-15 PROCEDURE — 82962 GLUCOSE BLOOD TEST: CPT

## 2024-04-15 RX ORDER — POTASSIUM CHLORIDE 750 MG/1
10 TABLET, EXTENDED RELEASE ORAL
Status: DISCONTINUED | OUTPATIENT
Start: 2024-04-15 | End: 2024-04-15

## 2024-04-15 RX ORDER — POTASSIUM CHLORIDE 750 MG/1
10 TABLET, EXTENDED RELEASE ORAL
Status: COMPLETED | OUTPATIENT
Start: 2024-04-15 | End: 2024-04-15

## 2024-04-15 RX ORDER — IBUPROFEN 600 MG/1
600 TABLET ORAL EVERY 8 HOURS PRN
Qty: 15 TABLET | Refills: 0 | Status: SHIPPED | OUTPATIENT
Start: 2024-04-15

## 2024-04-15 RX ORDER — ACETAMINOPHEN 500 MG
1000 TABLET ORAL
Status: COMPLETED | OUTPATIENT
Start: 2024-04-15 | End: 2024-04-15

## 2024-04-15 RX ADMIN — IOHEXOL 100 ML: 350 INJECTION, SOLUTION INTRAVENOUS at 04:04

## 2024-04-15 RX ADMIN — SODIUM CHLORIDE 1000 ML: 0.9 INJECTION, SOLUTION INTRAVENOUS at 04:04

## 2024-04-15 RX ADMIN — POTASSIUM CHLORIDE 10 MEQ: 750 TABLET, EXTENDED RELEASE ORAL at 06:04

## 2024-04-15 RX ADMIN — ACETAMINOPHEN 1000 MG: 500 TABLET ORAL at 06:04

## 2024-04-15 NOTE — Clinical Note
"Rosie Davila" Dmitriy was seen and treated in our emergency department on 4/15/2024.  She may return to work on 04/17/2024.  May return sooner if feeling better.      If you have any questions or concerns, please don't hesitate to call.      Aubree Herron MD"

## 2024-04-15 NOTE — ED PROVIDER NOTES
"Encounter Date: 4/15/2024    SCRIBE #1 NOTE: I, Augustin Felix, am scribing for, and in the presence of,  Aubree Herron MD.       History     Chief Complaint   Patient presents with    Headache     Pt presents w/ c/o loud ringing to right ear that lasted 30 seconds followed by a "pop" to right temporal area and now a frontal and occipital HA "pressure.  Pt also c/o feeling dizzy and nausea.  Gait stable.  Ian PA-C ruled out stroke code at triage.   Denies chest pain or sob.      Mrs. Rosie Izaguirre presents to the ED for evaluation of a headache that started this morning approximately 3-4 hours PTA. Patient reports that she had a loud ringing to the R ear, that blocked out all other noise, noting that it lasted for approximately 30 seconds-1 minute. Reports hearing a pop to the R temporal area and felt instantaneous pain (rated at a 6-7/10 in severity), which radiated from the R temporal area across the R forehead into the mid face. Patient is currently reporting of frontal and R occipital "pressure", dizziness, and "not feeling right." Denies a hx of similar presentation. Further reports of intermittent episodes of numbness to the fingertips, which she has had before and states that it feels similar to prior episodes. Denies paresthesia, weakness, double vision, CP, SOB, or any associated symptoms. Denies any recent illnesses/infection, medication changes or long travels. Denies anticoagulant use. PMHx significant for HTN and psychiatric care. NKDA.     The history is provided by the patient. No  was used.     Review of patient's allergies indicates:  No Known Allergies  Past Medical History:   Diagnosis Date    Hx of psychiatric care     Hypertension     Psychiatric problem     Therapy      Past Surgical History:   Procedure Laterality Date    APPENDECTOMY       Family History   Problem Relation Name Age of Onset    Hypertension Father      Anxiety disorder Daughter      Asperger's " syndrome Daughter       Social History     Tobacco Use    Smoking status: Never    Smokeless tobacco: Never   Substance Use Topics    Alcohol use: No    Drug use: No     Review of Systems   Eyes:  Negative for visual disturbance.   Respiratory:  Negative for shortness of breath.    Cardiovascular:  Negative for chest pain.   Neurological:  Positive for dizziness, numbness and headaches. Negative for weakness.        (-) Paresthesia.   All other systems reviewed and are negative.      Physical Exam     Initial Vitals [04/15/24 1500]   BP Pulse Resp Temp SpO2   (!) 179/98 (!) 113 18 97.6 °F (36.4 °C) 100 %      MAP       --         Physical Exam    Nursing note and vitals reviewed.  Constitutional: She appears well-developed and well-nourished. She is not diaphoretic. No distress.   HENT:   Head: Normocephalic and atraumatic.   Eyes: EOM are normal. Pupils are equal, round, and reactive to light. Right eye exhibits no nystagmus. Left eye exhibits no nystagmus.   Cardiovascular:  Normal rate, regular rhythm and normal heart sounds.           No murmur heard.  Pulmonary/Chest: Breath sounds normal. No respiratory distress.   Abdominal: Abdomen is soft. Bowel sounds are normal. She exhibits no distension and no mass. There is no abdominal tenderness. There is no guarding.   Musculoskeletal:         General: No tenderness or edema. Normal range of motion.     Neurological: She is oriented to person, place, and time. She has normal strength. No cranial nerve deficit or sensory deficit. She displays a negative Romberg sign.   No ataxia noted. 5/5 strength in BLE. Normal finger to nose.    Skin: Skin is warm and dry. No rash noted. No erythema.   Psychiatric: She has a normal mood and affect. Her behavior is normal. Judgment and thought content normal.         ED Course   Procedures  Labs Reviewed   CBC W/ AUTO DIFFERENTIAL - Abnormal; Notable for the following components:       Result Value    Hemoglobin 10.7 (*)      Hematocrit 33.6 (*)     MCV 78 (*)     MCH 24.8 (*)     MCHC 31.8 (*)     RDW 14.8 (*)     All other components within normal limits   ISTAT PROCEDURE - Abnormal; Notable for the following components:    POC BUN 3 (*)     POC Potassium 3.4 (*)     POC Hematocrit 34 (*)     All other components within normal limits   COMPREHENSIVE METABOLIC PANEL   ISTAT CHEM8          Imaging Results              CTA Head and Neck (xpd) (Final result)  Result time 04/15/24 17:36:05      Final result by Radha Simmons MD (04/15/24 17:36:05)                   Impression:      No acute abnormality. No large vessel occlusion.    Empty sella      Electronically signed by: Radha Simmons  Date:    04/15/2024  Time:    17:36               Narrative:    EXAMINATION:  CTA HEAD AND NECK (XPD)    CLINICAL HISTORY:  Sudden headache after ringing in R ear.    TECHNIQUE:  Non contrast low dose axial images were obtained through the head. CT angiogram was performed from the level of the lamont to the top of the head following the IV administration of 100mL of Omnipaque 350.   Sagittal and coronal reconstructions and maximum intensity projection reconstructions were performed. Arterial stenosis percentages are based on NASCET measurement criteria.    COMPARISON:  None    FINDINGS:  Intracranial Compartment:    Ventricles and sulci are normal in size for age without evidence of hydrocephalus. No extra-axial blood or fluid collections.    The brain parenchyma appears normal. No parenchymal mass, hemorrhage, edema, or major vascular distribution infarct.    Skull/Extracranial Contents (limited evaluation): Empty sella.  No fracture. Mastoid air cells and paranasal sinuses are essentially clear.    Non-Vascular Structures of the Neck/Thoracic Inlet (limited evaluation): Normal.  Cleft of the posterior arch of C1, which is a normal variant.    Aorta: Normal 3 vessel arch.    Extracranial carotid circulation: No hemodynamically significant  "stenosis, aneurysmal dilatation, or dissection.    Extracranial vertebral circulation: No hemodynamically significant stenosis, aneurysmal dilatation, or dissection.    Intracranial Arteries: No focal high-grade stenosis, occlusion, or aneurysm.    Venous structures (limited evaluation): Normal.                                       Medications   sodium chloride 0.9% bolus 1,000 mL 1,000 mL (0 mLs Intravenous Stopped 4/15/24 1829)   iohexoL (OMNIPAQUE 350) injection 100 mL (100 mLs Intravenous Given 4/15/24 1649)   acetaminophen tablet 1,000 mg (1,000 mg Oral Given 4/15/24 1842)   potassium chloride SA CR tablet 10 mEq (10 mEq Oral Given 4/15/24 1842)     Medical Decision Making  43-year-old with sudden onset of ringing in the right ear around 1045 this morning followed by a pop sensation to the right side of her head and a headache, 7/10, that lasted about 30 minutes at this intensity and then mostly resolved.  She reports she just feels a little "dizzy" and a little "off".  She denies any gait instability at any time.  She denies any focal numbness, tingling or weakness at any time other than some tingling to the tips of the fingers of the right hand which she reports has been chronic and present for "a while".  She has no other complaints.  Differential diagnosis is broad and includes but is not limited to CVA, subarachnoid, other intracranial abnormality, metabolic, infectious, inflammatory, hemodynamic and other etiologies.     Labs performed including CBC, CMP are reassuring.  CT head without and CTA head/neck are acutely negative per Radiology.  Checked on patient.  She is resting comfortably.  She reports a pain scale of 4/10.  She is watching diffuse baseball game on her iPad.  I have placed a call to Neurology for further recommendation given atypical headache. Aubree Herron MD, 04/15/2024 6:43 PM    Spoke w Dr. Neri, neurology, discussed clinical picture. No further imaging or testing recommended at " this time. Recommends f/u in clinic.   Checked on pt. Pain is currently 0/10. She has no complaints. Continues to have negative neuro exam, no meningeal signs, and is now pain free. Is happy w plan for d/c and f/u in clinic. Of note, pts bp is elevatd today. Will recheck w pcp in a couple of days, used to be on bp meds years ago, not recently, will revisit w pcp. Discussed all lab and imaging results including empty sella, also discussed w neurology. We discussed home care and return precautions. Pt is happy w plan. There is no indication for further emergent intervention or evaluation at this time.             Amount and/or Complexity of Data Reviewed  Labs: ordered. Decision-making details documented in ED Course.  Radiology: ordered. Decision-making details documented in ED Course.    Risk  OTC drugs.  Prescription drug management.            Scribe Attestation:   Scribe #1: I performed the above scribed service and the documentation accurately describes the services I performed. I attest to the accuracy of the note.                               Clinical Impression:  Final diagnoses:  [R51.9] Acute nonintractable headache, unspecified headache type (Primary)             I, Aubree Herron MD, personally performed the services described in this documentation. All medical record entries made by the scribe were at my direction and in my presence. I have reviewed the chart and agree that the record reflects my personal performance and is accurate and complete.         ED Disposition Condition    Discharge Stable          ED Prescriptions       Medication Sig Dispense Start Date End Date Auth. Provider    ibuprofen (ADVIL,MOTRIN) 600 MG tablet Take 1 tablet (600 mg total) by mouth every 8 (eight) hours as needed for Pain. 15 tablet 4/15/2024 -- Aubree Herron MD          Follow-up Information       Follow up With Specialties Details Why Contact Info    Hang Mai MD Family Medicine In 2 days BP recheck and  follow up [address] [address]  [phone]      Darrin Garcia MD Neurology Schedule an appointment as soon as possible for a visit  at [facility] location for follow up. [address] [facility]  [address]  [facility] [address]  [phone]      [facility] - Emergency Dept Emergency Medicine  As needed, If symptoms worsen [address] [facility] [place] [id number] [phone]             Aubree Herron MD  04/15/24 [id number]

## 2024-04-15 NOTE — ED NOTES
Pt sitting up, respirations even/unlabored. Nad noted. Updated pt on poc. Pt denies any needs at this time. Side rails upx2, call bell within reach. Will continue to monitor.

## 2024-04-15 NOTE — ED TRIAGE NOTES
"Pt arrives to ED with c/o HA. Pt reports around 1045 this morning pt began to have ringing in ears that lasted approx 30 seconds, states then heard a "pop" noise to R temporal and states pain caused her to stop what she was doing.describes pain as pressure. Denies any gait problems. Denies weakness/numbness. Reports pressure at this time. NAD  "

## 2024-04-16 NOTE — ED NOTES
Care taken over. Pt awake and alert; resting quietly on stretcher.  Pt remains on continuous cardiac and pulse ox monitoring with non-invasive blood pressure to cycle every 30 minutes.  VS stable; NSR noted.  Pt denies restroom needs at this time; is able to reposition self on stretcher. Bed locked in lowest position; side rails up and locked x 2; call light, bedside table, and personal belongings within reach. Room assessed for safety measures and cleanliness; no action needed at this time. Plan of care discussed.  Pt instructed to alert nurse for assistance and before attempting to get out of bed; verbalizes understanding. Pt denies needs or complaints at this time

## 2024-04-16 NOTE — DISCHARGE INSTRUCTIONS
It was a pleasure taking care of you this evening. Thank you for your patience. I'm glad you're feeling better!  Rest. Drink plenty of fluids. Return for any new or acute problems or concerns.   Follow up with neurology and with primary care as discussed.   Call Dr. Herron [phone] for any questions over the next couple of days.

## 2024-04-21 ENCOUNTER — PATIENT MESSAGE (OUTPATIENT)
Dept: FAMILY MEDICINE | Facility: CLINIC | Age: 44
End: 2024-04-21
Payer: COMMERCIAL

## 2024-04-23 ENCOUNTER — OFFICE VISIT (OUTPATIENT)
Dept: FAMILY MEDICINE | Facility: CLINIC | Age: 44
End: 2024-04-23
Payer: COMMERCIAL

## 2024-04-23 VITALS
TEMPERATURE: 99 F | WEIGHT: 206.38 LBS | BODY MASS INDEX: 40.52 KG/M2 | HEART RATE: 106 BPM | SYSTOLIC BLOOD PRESSURE: 142 MMHG | RESPIRATION RATE: 16 BRPM | DIASTOLIC BLOOD PRESSURE: 76 MMHG | HEIGHT: 60 IN | OXYGEN SATURATION: 99 %

## 2024-04-23 DIAGNOSIS — Z12.12 ENCOUNTER FOR COLORECTAL CANCER SCREENING: ICD-10-CM

## 2024-04-23 DIAGNOSIS — D50.9 MICROCYTIC ANEMIA: Primary | ICD-10-CM

## 2024-04-23 DIAGNOSIS — R42 DIZZINESS: ICD-10-CM

## 2024-04-23 DIAGNOSIS — Z12.11 ENCOUNTER FOR COLORECTAL CANCER SCREENING: ICD-10-CM

## 2024-04-23 DIAGNOSIS — I10 ESSENTIAL HYPERTENSION: ICD-10-CM

## 2024-04-23 DIAGNOSIS — Z12.31 ENCOUNTER FOR SCREENING MAMMOGRAM FOR BREAST CANCER: ICD-10-CM

## 2024-04-23 PROCEDURE — 99999 PR PBB SHADOW E&M-EST. PATIENT-LVL IV: CPT | Mod: PBBFAC,,, | Performed by: NURSE PRACTITIONER

## 2024-04-23 PROCEDURE — 4010F ACE/ARB THERAPY RXD/TAKEN: CPT | Mod: CPTII,S$GLB,, | Performed by: NURSE PRACTITIONER

## 2024-04-23 PROCEDURE — 1159F MED LIST DOCD IN RCRD: CPT | Mod: CPTII,S$GLB,, | Performed by: NURSE PRACTITIONER

## 2024-04-23 PROCEDURE — 3078F DIAST BP <80 MM HG: CPT | Mod: CPTII,S$GLB,, | Performed by: NURSE PRACTITIONER

## 2024-04-23 PROCEDURE — 1160F RVW MEDS BY RX/DR IN RCRD: CPT | Mod: CPTII,S$GLB,, | Performed by: NURSE PRACTITIONER

## 2024-04-23 PROCEDURE — 3008F BODY MASS INDEX DOCD: CPT | Mod: CPTII,S$GLB,, | Performed by: NURSE PRACTITIONER

## 2024-04-23 PROCEDURE — 3077F SYST BP >= 140 MM HG: CPT | Mod: CPTII,S$GLB,, | Performed by: NURSE PRACTITIONER

## 2024-04-23 PROCEDURE — 99213 OFFICE O/P EST LOW 20 MIN: CPT | Mod: S$GLB,,, | Performed by: NURSE PRACTITIONER

## 2024-04-23 RX ORDER — MECLIZINE HYDROCHLORIDE 25 MG/1
25 TABLET ORAL 3 TIMES DAILY PRN
Qty: 30 TABLET | Refills: 1 | Status: SHIPPED | OUTPATIENT
Start: 2024-04-23 | End: 2024-06-14

## 2024-04-23 RX ORDER — LISINOPRIL 5 MG/1
5 TABLET ORAL DAILY
Qty: 90 TABLET | Refills: 0 | Status: SHIPPED | OUTPATIENT
Start: 2024-04-23

## 2024-04-23 NOTE — PROGRESS NOTES
Subjective:       Patient ID: Rosie Izaguirre is a 43 y.o. female.    Chief Complaint: Follow-up    HPI    Rosie Izaguirre is a 43 y.o. female patient that presents to clinic for emergency room follow-up. Past medical and surgical history reviewed as listed. PCP is Hang Mai MD , she is  new  to me.  Patient was recently evaluated in the emergency room on 04/15/2024 after experiencing a severe headache associated with a pop to right temporal area and severe tinnitus.  ER visit reviewed at length.  Patient initially was hypertensive and tachycardic upon arrival.  /90.  Heart rate 113.  Patient reports she discontinued lisinopril 5 mg p.o. daily.  CTA head and neck was negative for vessel occlusion.    Hypertension  This is a chronic problem. The problem is uncontrolled. Associated symptoms include headaches. Pertinent negatives include no orthopnea or palpitations. There are no associated agents to hypertension. Risk factors for coronary artery disease include obesity. Past treatments include ACE inhibitors.     ROS as listed.  Past Medical History:   Diagnosis Date    Hx of psychiatric care     Hypertension     Psychiatric problem     Therapy       Past Surgical History:   Procedure Laterality Date    APPENDECTOMY        Family History   Problem Relation Name Age of Onset    Hypertension Father      Anxiety disorder Daughter      Asperger's syndrome Daughter        Review of patient's allergies indicates:  No Known Allergies  Review of Systems   Cardiovascular:  Negative for palpitations and orthopnea.   Neurological:  Positive for dizziness and headaches.       Objective:      Vitals:    04/23/24 1331   BP: (!) 142/76   BP Location: Left arm   Patient Position: Sitting   BP Method: Large (Manual)   Pulse: 106   Resp: 16   Temp: 98.8 °F (37.1 °C)   TempSrc: Oral   SpO2: 99%   Weight: 93.6 kg (206 lb 5.6 oz)   Height: 5' (1.524 m)      Physical Exam  Vitals and nursing note reviewed.   Constitutional:        General: She is not in acute distress.  HENT:      Head: Normocephalic.   Eyes:      Conjunctiva/sclera: Conjunctivae normal.      Pupils: Pupils are equal, round, and reactive to light.   Cardiovascular:      Rate and Rhythm: Normal rate and regular rhythm.      Heart sounds: Normal heart sounds. No murmur heard.  Pulmonary:      Effort: Pulmonary effort is normal. No respiratory distress.      Breath sounds: Normal breath sounds.   Musculoskeletal:      Cervical back: Normal range of motion.   Skin:     General: Skin is warm and dry.   Neurological:      Mental Status: She is alert and oriented to person, place, and time.      Cranial Nerves: No cranial nerve deficit.      Motor: No weakness.      Gait: Gait normal.   Psychiatric:         Mood and Affect: Mood normal.         Behavior: Behavior normal.         Lab Results   Component Value Date    WBC 10.58 04/15/2024    HGB 10.7 (L) 04/15/2024    HCT 34 (L) 04/15/2024     04/15/2024    CHOL 197 08/06/2019    TRIG 53 08/06/2019    HDL 54 08/06/2019    ALT 18 04/15/2024    AST 20 04/15/2024     04/15/2024    K 3.5 04/15/2024     04/15/2024    CREATININE 0.8 04/15/2024    BUN 6 04/15/2024    CO2 25 04/15/2024    TSH 2.149 08/06/2019    HGBA1C 5.3 08/06/2019      Assessment:       1. Microcytic anemia    2. Essential hypertension    3. Encounter for screening mammogram for breast cancer    4. Encounter for colorectal cancer screening    5. Dizziness        Plan:       Microcytic anemia  -     IRON AND TIBC; Future; Expected date: 04/23/2024  -     FERRITIN; Future; Expected date: 04/23/2024    Essential hypertension  Restart lisinopril 5 mg p.o. daily.  -     TSH; Future; Expected date: 04/23/2024  -     LIPID PANEL; Future; Expected date: 04/23/2024  -     Hemoglobin A1C; Future; Expected date: 04/23/2024  -     lisinopriL (PRINIVIL,ZESTRIL) 5 MG tablet; Take 1 tablet (5 mg total) by mouth once daily.  Dispense: 90 tablet; Refill: 0    Encounter  for screening mammogram for breast cancer  -     Mammo Digital Screening Bilat w/ Andi; Future; Expected date: 04/23/2024    Encounter for colorectal cancer screening  -     Cologuard Screening (Multitarget Stool DNA); Future; Expected date: 04/23/2024    Dizziness  If dizziness does not improve consider referral to ENT or neurology.  -     meclizine (ANTIVERT) 25 mg tablet; Take 1 tablet (25 mg total) by mouth 3 (three) times daily as needed for Dizziness.  Dispense: 30 tablet; Refill: 1      Medication List with Changes/Refills   New Medications    MECLIZINE (ANTIVERT) 25 MG TABLET    Take 1 tablet (25 mg total) by mouth 3 (three) times daily as needed for Dizziness.   Current Medications    DEXTROAMPHETAMINE-AMPHETAMINE (ADDERALL) 20 MG TABLET        FOLIC ACID-VIT B6-VIT B12 2.5-25-2 MG (FOLBIC OR EQUIV) 2.5-25-2 MG TAB    Take 1 tablet by mouth once daily.    IBUPROFEN (ADVIL,MOTRIN) 600 MG TABLET    Take 1 tablet (600 mg total) by mouth every 8 (eight) hours as needed for Pain.    VENLAFAXINE (EFFEXOR-XR) 75 MG 24 HR CAPSULE        ZOLPIDEM (AMBIEN) 10 MG TAB    TAKE 1 TABLET(S) BY MOUTH EVERY NIGHT AT BEDTIME   Changed and/or Refilled Medications    Modified Medication Previous Medication    LISINOPRIL (PRINIVIL,ZESTRIL) 5 MG TABLET lisinopril (PRINIVIL,ZESTRIL) 5 MG tablet       Take 1 tablet (5 mg total) by mouth once daily.    Take 1 tablet (5 mg total) by mouth once daily.                Marilyn Bae, DNP, APRN, FNP-C  Family Medicine  [facility]

## 2024-04-23 NOTE — PROGRESS NOTES
Health Maintenance Due   Topic     Hepatitis C Screening      HIV Screening      TETANUS VACCINE      Mammogram      Hemoglobin A1c (Diabetic Prevention Screening)      Influenza Vaccine (1)     COVID-19 Vaccine (3 - 2023-24 season)     Colorectal Cancer Screening

## 2024-04-25 ENCOUNTER — TELEPHONE (OUTPATIENT)
Dept: EMERGENCY MEDICINE | Facility: HOSPITAL | Age: 44
End: 2024-04-25
Payer: COMMERCIAL

## 2024-06-14 DIAGNOSIS — R42 DIZZINESS: ICD-10-CM

## 2024-06-14 RX ORDER — MECLIZINE HYDROCHLORIDE 25 MG/1
25 TABLET ORAL 3 TIMES DAILY PRN
Qty: 30 TABLET | Refills: 1 | Status: SHIPPED | OUTPATIENT
Start: 2024-06-14

## 2024-06-28 LAB
CHOLEST SERPL-MCNC: 246 MG/DL
CHOLEST/HDLC SERPL: 3.4 (CALC)
FERRITIN SERPL-MCNC: 9 NG/ML (ref 16–232)
HBA1C MFR BLD: 5.5 % OF TOTAL HGB
HDLC SERPL-MCNC: 73 MG/DL
IRON SATN MFR SERPL: 16 % (CALC) (ref 16–45)
IRON SERPL-MCNC: 56 MCG/DL (ref 40–190)
LDLC SERPL CALC-MCNC: 155 MG/DL (CALC)
NONHDLC SERPL-MCNC: 173 MG/DL (CALC)
TIBC SERPL-MCNC: 361 MCG/DL (CALC) (ref 250–450)
TRIGL SERPL-MCNC: 81 MG/DL
TSH SERPL-ACNC: 1.62 MIU/L

## 2024-07-05 DIAGNOSIS — R42 DIZZINESS: ICD-10-CM

## 2024-07-05 RX ORDER — MECLIZINE HYDROCHLORIDE 25 MG/1
25 TABLET ORAL
Qty: 30 TABLET | Refills: 1 | Status: SHIPPED | OUTPATIENT
Start: 2024-07-05

## 2024-07-23 ENCOUNTER — TELEPHONE (OUTPATIENT)
Dept: FAMILY MEDICINE | Facility: CLINIC | Age: 44
End: 2024-07-23

## 2024-07-23 ENCOUNTER — OFFICE VISIT (OUTPATIENT)
Dept: FAMILY MEDICINE | Facility: CLINIC | Age: 44
End: 2024-07-23
Payer: COMMERCIAL

## 2024-07-23 ENCOUNTER — PATIENT MESSAGE (OUTPATIENT)
Dept: FAMILY MEDICINE | Facility: CLINIC | Age: 44
End: 2024-07-23

## 2024-07-23 VITALS
WEIGHT: 205.5 LBS | HEART RATE: 97 BPM | TEMPERATURE: 98 F | OXYGEN SATURATION: 98 % | HEIGHT: 60 IN | DIASTOLIC BLOOD PRESSURE: 80 MMHG | SYSTOLIC BLOOD PRESSURE: 134 MMHG | BODY MASS INDEX: 40.34 KG/M2

## 2024-07-23 DIAGNOSIS — I10 ESSENTIAL HYPERTENSION: ICD-10-CM

## 2024-07-23 DIAGNOSIS — E66.9 OBESITY (BMI 35.0-39.9 WITHOUT COMORBIDITY): ICD-10-CM

## 2024-07-23 DIAGNOSIS — Z00.00 ANNUAL PHYSICAL EXAM: Primary | ICD-10-CM

## 2024-07-23 DIAGNOSIS — Z11.1 SCREENING FOR TUBERCULOSIS: ICD-10-CM

## 2024-07-23 PROCEDURE — 99396 PREV VISIT EST AGE 40-64: CPT | Mod: S$GLB,,, | Performed by: FAMILY MEDICINE

## 2024-07-23 PROCEDURE — 3008F BODY MASS INDEX DOCD: CPT | Mod: CPTII,S$GLB,, | Performed by: FAMILY MEDICINE

## 2024-07-23 PROCEDURE — 1159F MED LIST DOCD IN RCRD: CPT | Mod: CPTII,S$GLB,, | Performed by: FAMILY MEDICINE

## 2024-07-23 PROCEDURE — 3044F HG A1C LEVEL LT 7.0%: CPT | Mod: CPTII,S$GLB,, | Performed by: FAMILY MEDICINE

## 2024-07-23 PROCEDURE — 4010F ACE/ARB THERAPY RXD/TAKEN: CPT | Mod: CPTII,S$GLB,, | Performed by: FAMILY MEDICINE

## 2024-07-23 PROCEDURE — 99999 PR PBB SHADOW E&M-EST. PATIENT-LVL III: CPT | Mod: PBBFAC,,, | Performed by: FAMILY MEDICINE

## 2024-07-23 PROCEDURE — 3075F SYST BP GE 130 - 139MM HG: CPT | Mod: CPTII,S$GLB,, | Performed by: FAMILY MEDICINE

## 2024-07-23 PROCEDURE — 3079F DIAST BP 80-89 MM HG: CPT | Mod: CPTII,S$GLB,, | Performed by: FAMILY MEDICINE

## 2024-07-23 RX ORDER — LISINOPRIL 5 MG/1
5 TABLET ORAL DAILY
Qty: 90 TABLET | Refills: 3 | Status: SHIPPED | OUTPATIENT
Start: 2024-07-23

## 2024-07-23 RX ORDER — SEMAGLUTIDE 0.25 MG/.5ML
0.25 INJECTION, SOLUTION SUBCUTANEOUS
Qty: 2 ML | Refills: 0 | Status: SHIPPED | OUTPATIENT
Start: 2024-07-23

## 2024-07-23 NOTE — TELEPHONE ENCOUNTER
Called patient to clarify the blood work vs PPD placement. Patient was stating that she was not able to make it to lab before 1400 today. She will go tot the [facility] on Friday morning before 1400 to have it drawn.

## 2024-07-23 NOTE — PROGRESS NOTES
Chief Complaint   Patient presents with    Annual Exam       SUBJECTIVE:   43 y.o. female for annual routine checkup.    Current Outpatient Medications   Medication Sig Dispense Refill    dextroamphetamine-amphetamine (ADDERALL) 20 mg tablet       folic acid-vit B6-vit B12 2.5-25-2 mg (FOLBIC OR EQUIV) 2.5-25-2 mg Tab Take 1 tablet by mouth once daily. 30 tablet 0    meclizine (ANTIVERT) 25 mg tablet TAKE 1 TABLET BY MOUTH THREE TIMES DAILY AS NEEDED FOR DIZZINESS 30 tablet 1    venlafaxine (EFFEXOR-XR) 75 MG 24 hr capsule       zolpidem (AMBIEN) 10 mg Tab TAKE 1 TABLET(S) BY MOUTH EVERY NIGHT AT BEDTIME  3    lisinopriL (PRINIVIL,ZESTRIL) 5 MG tablet Take 1 tablet (5 mg total) by mouth once daily. 90 tablet 3    semaglutide, weight loss, (WEGOVY) 0.25 mg/0.5 mL PnIj Inject 0.25 mg into the skin every 7 days. 2 mL 0     No current facility-administered medications for this visit.     Allergies: Patient has no known allergies.   Patient's last menstrual period was 07/16/2024.    ROS:  Feeling well. No dyspnea or chest pain on exertion.  No abdominal pain, change in bowel habits, black or bloody stools.  No urinary tract symptoms. GYN ROS: no abnormal bleeding. No neurological complaints.    OBJECTIVE:   The patient appears well, alert, oriented x 3, in no distress.  /80   Pulse 97   Temp 98.1 °F (36.7 °C) (Oral)   Ht 5' (1.524 m)   Wt 93.2 kg (205 lb 7.5 oz)   LMP 07/16/2024   SpO2 98%   BMI 40.13 kg/m²   Wt Readings from Last 5 Encounters:   07/23/24 93.2 kg (205 lb 7.5 oz)   04/23/24 93.6 kg (206 lb 5.6 oz)   04/15/24 90.7 kg (200 lb)   03/05/20 90 kg (198 lb 6.6 oz)   08/06/19 92.1 kg (203 lb 0.7 oz)       ENT normal.  Neck supple. No adenopathy or thyromegaly. SHI. Lungs are clear, good air entry, no wheezes, rhonchi or rales. S1 and S2 normal, no murmurs, regular rate and rhythm. Abdomen soft without tenderness, guarding, mass or organomegaly. Extremities show no edema, normal peripheral pulses.  Neurological is normal, no focal findings.      BREAST EXAM: deferred    PELVIC EXAM: deferred    ASSESSMENT:   1. Annual physical exam    2. Essential hypertension    3. Obesity (BMI 35.0-39.9 without comorbidity)    4. Screening for tuberculosis          PLAN:   Counseled on age appropriate medical preventative services, including age appropriate cancer screenings, over all nutritional health, need for a consistent exercise regimen and an over all push towards maintaining a vigorous and active lifestyle.  Counseled on age appropriate vaccines and discussed upcoming health care needs based on age/gender.  Spent time with patient counseling on need for a good patient/doctor relationship moving forward.  Discussed use of common OTC medications and supplements.  Discussed common dietary aids and use of caffeine and the need for good sleep hygiene and stress management.    Problem List Items Addressed This Visit       Obesity (BMI 35.0-39.9 without comorbidity)    Relevant Medications    semaglutide, weight loss, (WEGOVY) 0.25 mg/0.5 mL PnIj    Essential hypertension    Overview     New over last year  No secondary affects to date  ekg 1/2017-normal  HCTZ 1/2017-too much urination 2/17 discontinued  2/2017-lisinopril 5  Start digital HTN program 1/2017         Relevant Medications    lisinopriL (PRINIVIL,ZESTRIL) 5 MG tablet    semaglutide, weight loss, (WEGOVY) 0.25 mg/0.5 mL PnIj     Other Visit Diagnoses       Annual physical exam    -  Primary    Screening for tuberculosis        Relevant Orders    QUANTIFERON GOLD TB            F/u in 1 year for wellness

## 2024-07-30 ENCOUNTER — LAB VISIT (OUTPATIENT)
Dept: LAB | Facility: HOSPITAL | Age: 44
End: 2024-07-30
Attending: FAMILY MEDICINE
Payer: COMMERCIAL

## 2024-07-30 DIAGNOSIS — Z11.1 SCREENING FOR TUBERCULOSIS: ICD-10-CM

## 2024-07-30 PROCEDURE — 86480 TB TEST CELL IMMUN MEASURE: CPT | Performed by: FAMILY MEDICINE

## 2024-07-31 LAB
GAMMA INTERFERON BACKGROUND BLD IA-ACNC: 0.03 IU/ML
M TB IFN-G CD4+ BCKGRND COR BLD-ACNC: -0 IU/ML
M TB IFN-G CD4+ BCKGRND COR BLD-ACNC: -0 IU/ML
MITOGEN IGNF BCKGRD COR BLD-ACNC: 9.97 IU/ML
TB GOLD PLUS: NEGATIVE

## 2024-09-07 ENCOUNTER — PATIENT MESSAGE (OUTPATIENT)
Dept: FAMILY MEDICINE | Facility: CLINIC | Age: 44
End: 2024-09-07
Payer: COMMERCIAL

## 2025-04-30 DIAGNOSIS — I10 ESSENTIAL HYPERTENSION: ICD-10-CM

## 2025-06-24 ENCOUNTER — PATIENT MESSAGE (OUTPATIENT)
Dept: FAMILY MEDICINE | Facility: CLINIC | Age: 45
End: 2025-06-24
Payer: COMMERCIAL

## 2025-07-14 ENCOUNTER — PATIENT OUTREACH (OUTPATIENT)
Dept: ADMINISTRATIVE | Facility: HOSPITAL | Age: 45
End: 2025-07-14
Payer: COMMERCIAL

## 2025-07-14 DIAGNOSIS — Z12.31 BREAST CANCER SCREENING BY MAMMOGRAM: Primary | ICD-10-CM

## 2025-07-14 DIAGNOSIS — I10 ESSENTIAL HYPERTENSION: ICD-10-CM

## 2025-07-14 RX ORDER — LISINOPRIL 5 MG/1
5 TABLET ORAL
Qty: 90 TABLET | Refills: 0 | Status: SHIPPED | OUTPATIENT
Start: 2025-07-14

## 2025-07-14 NOTE — TELEPHONE ENCOUNTER
Refill Routing Note   Medication(s) are not appropriate for processing by [facility] for the following reason(s):        Required labs outdated    [facility] action(s):  Defer   Requires labs : Yes             Appointments  past 12m or future 3m with PCP    Date Provider   Last Visit   7/23/2024 Hang Mai MD   Next Visit   Visit date not found Hang Mai MD   ED visits in past 90 days: 0        Note composed:10:50 AM 07/14/2025

## 2025-07-14 NOTE — TELEPHONE ENCOUNTER
Care Due:                  Date            Visit Type   Department     Provider  --------------------------------------------------------------------------------                                [facility]                              PRIMARY      MEDICINE /  Last Visit: 07-      CARE ([facility])   INTERNAL MED   Hang Mai  Next Visit: None Scheduled  None         None Found                                                            Last  Test          Frequency    Reason                     Performed    Due Date  --------------------------------------------------------------------------------    CMP.........  12 months..  lisinopriL...............  04-   04-    HBA1C.......  6 months...  semaglutide,.............  06- 12-    [facility] Embedded Care Due Messages. Reference number: [id number].   7/14/2025 9:25:50 AM CDT

## 2025-08-06 ENCOUNTER — PATIENT MESSAGE (OUTPATIENT)
Dept: FAMILY MEDICINE | Facility: CLINIC | Age: 45
End: 2025-08-06
Payer: COMMERCIAL